# Patient Record
Sex: FEMALE | Race: WHITE | NOT HISPANIC OR LATINO | Employment: FULL TIME | URBAN - METROPOLITAN AREA
[De-identification: names, ages, dates, MRNs, and addresses within clinical notes are randomized per-mention and may not be internally consistent; named-entity substitution may affect disease eponyms.]

---

## 2017-12-18 ENCOUNTER — TRANSFERRED RECORDS (OUTPATIENT)
Dept: HEALTH INFORMATION MANAGEMENT | Facility: CLINIC | Age: 52
End: 2017-12-18

## 2018-08-28 ENCOUNTER — TRANSFERRED RECORDS (OUTPATIENT)
Dept: HEALTH INFORMATION MANAGEMENT | Facility: CLINIC | Age: 53
End: 2018-08-28

## 2018-09-27 ENCOUNTER — TRANSFERRED RECORDS (OUTPATIENT)
Dept: HEALTH INFORMATION MANAGEMENT | Facility: CLINIC | Age: 53
End: 2018-09-27

## 2018-11-08 ENCOUNTER — OFFICE VISIT (OUTPATIENT)
Dept: SURGERY | Facility: CLINIC | Age: 53
End: 2018-11-08
Payer: COMMERCIAL

## 2018-11-08 ENCOUNTER — TELEPHONE (OUTPATIENT)
Dept: GASTROENTEROLOGY | Facility: CLINIC | Age: 53
End: 2018-11-08

## 2018-11-08 VITALS
BODY MASS INDEX: 50.21 KG/M2 | WEIGHT: 283.4 LBS | TEMPERATURE: 98 F | SYSTOLIC BLOOD PRESSURE: 132 MMHG | HEART RATE: 66 BPM | HEIGHT: 63 IN | DIASTOLIC BLOOD PRESSURE: 77 MMHG | OXYGEN SATURATION: 99 %

## 2018-11-08 VITALS
OXYGEN SATURATION: 99 % | SYSTOLIC BLOOD PRESSURE: 132 MMHG | HEART RATE: 66 BPM | HEIGHT: 63 IN | TEMPERATURE: 98 F | DIASTOLIC BLOOD PRESSURE: 77 MMHG | WEIGHT: 283.4 LBS | BODY MASS INDEX: 50.21 KG/M2

## 2018-11-08 DIAGNOSIS — E66.01 MORBID OBESITY (H): ICD-10-CM

## 2018-11-08 DIAGNOSIS — E66.01 MORBID OBESITY (H): Primary | ICD-10-CM

## 2018-11-08 DIAGNOSIS — Z98.84 S/P GASTRIC BYPASS: ICD-10-CM

## 2018-11-08 DIAGNOSIS — K63.89 BACTERIAL OVERGROWTH SYNDROME: ICD-10-CM

## 2018-11-08 DIAGNOSIS — Z98.84 S/P GASTRIC BYPASS: Primary | ICD-10-CM

## 2018-11-08 LAB
ALBUMIN SERPL-MCNC: 3.5 G/DL (ref 3.4–5)
ALP SERPL-CCNC: 79 U/L (ref 40–150)
ALT SERPL W P-5'-P-CCNC: 28 U/L (ref 0–50)
ANION GAP SERPL CALCULATED.3IONS-SCNC: 8 MMOL/L (ref 3–14)
AST SERPL W P-5'-P-CCNC: 29 U/L (ref 0–45)
BILIRUB SERPL-MCNC: 0.5 MG/DL (ref 0.2–1.3)
BUN SERPL-MCNC: 11 MG/DL (ref 7–30)
CALCIUM SERPL-MCNC: 8.9 MG/DL (ref 8.5–10.1)
CHLORIDE SERPL-SCNC: 104 MMOL/L (ref 94–109)
CO2 SERPL-SCNC: 27 MMOL/L (ref 20–32)
CREAT SERPL-MCNC: 0.86 MG/DL (ref 0.52–1.04)
DEPRECATED CALCIDIOL+CALCIFEROL SERPL-MC: 34 UG/L (ref 20–75)
ERYTHROCYTE [DISTWIDTH] IN BLOOD BY AUTOMATED COUNT: 14.1 % (ref 10–15)
FERRITIN SERPL-MCNC: 25 NG/ML (ref 8–252)
GFR SERPL CREATININE-BSD FRML MDRD: 69 ML/MIN/1.7M2
GLUCOSE SERPL-MCNC: 89 MG/DL (ref 70–99)
HBA1C MFR BLD: 6 % (ref 0–5.6)
HCT VFR BLD AUTO: 45.4 % (ref 35–47)
HGB BLD-MCNC: 14.2 G/DL (ref 11.7–15.7)
MCH RBC QN AUTO: 27.7 PG (ref 26.5–33)
MCHC RBC AUTO-ENTMCNC: 31.3 G/DL (ref 31.5–36.5)
MCV RBC AUTO: 89 FL (ref 78–100)
PLATELET # BLD AUTO: 294 10E9/L (ref 150–450)
POTASSIUM SERPL-SCNC: 4.4 MMOL/L (ref 3.4–5.3)
PROT SERPL-MCNC: 7.5 G/DL (ref 6.8–8.8)
PTH-INTACT SERPL-MCNC: 80 PG/ML (ref 18–80)
RBC # BLD AUTO: 5.13 10E12/L (ref 3.8–5.2)
SODIUM SERPL-SCNC: 139 MMOL/L (ref 133–144)
VIT B12 SERPL-MCNC: 635 PG/ML (ref 193–986)
WBC # BLD AUTO: 8.6 10E9/L (ref 4–11)

## 2018-11-08 RX ORDER — ASCORBIC ACID 500 MG
TABLET ORAL
COMMUNITY
Start: 2005-04-25

## 2018-11-08 RX ORDER — FLAXSEED OIL
OIL (ML) MISCELLANEOUS
COMMUNITY
Start: 2013-11-22 | End: 2020-06-08

## 2018-11-08 RX ORDER — OMEPRAZOLE 40 MG/1
40 CAPSULE, DELAYED RELEASE ORAL
COMMUNITY
Start: 2013-11-22

## 2018-11-08 RX ORDER — FLUOCINONIDE 0.5 MG/G
OINTMENT TOPICAL
COMMUNITY
Start: 2018-10-17 | End: 2020-06-08

## 2018-11-08 RX ORDER — ACETAMINOPHEN 160 MG
TABLET,DISINTEGRATING ORAL
COMMUNITY
Start: 2013-11-21

## 2018-11-08 RX ORDER — METRONIDAZOLE 500 MG/1
500 TABLET ORAL 2 TIMES DAILY
Qty: 20 TABLET | Refills: 0 | Status: SHIPPED | OUTPATIENT
Start: 2018-11-08 | End: 2020-06-08

## 2018-11-08 RX ORDER — CETIRIZINE HYDROCHLORIDE 10 MG/1
10 TABLET ORAL
COMMUNITY
Start: 2018-01-08 | End: 2021-07-27

## 2018-11-08 RX ORDER — CIPROFLOXACIN 500 MG/1
500 TABLET, FILM COATED ORAL 2 TIMES DAILY
Qty: 20 TABLET | Refills: 0 | Status: SHIPPED | OUTPATIENT
Start: 2018-11-08 | End: 2020-06-08

## 2018-11-08 RX ORDER — CHLORAL HYDRATE 500 MG
CAPSULE ORAL
COMMUNITY
Start: 2013-11-22 | End: 2020-06-08

## 2018-11-08 RX ORDER — LANOLIN ALCOHOL/MO/W.PET/CERES
1000 CREAM (GRAM) TOPICAL
COMMUNITY
Start: 2013-11-22

## 2018-11-08 RX ORDER — M-VIT,TX,IRON,MINS/CALC/FOLIC 27MG-0.4MG
TABLET ORAL
COMMUNITY
Start: 2004-10-07

## 2018-11-08 ASSESSMENT — PATIENT HEALTH QUESTIONNAIRE - PHQ9
SUM OF ALL RESPONSES TO PHQ QUESTIONS 1-9: 9
SUM OF ALL RESPONSES TO PHQ QUESTIONS 1-9: 9
10. IF YOU CHECKED OFF ANY PROBLEMS, HOW DIFFICULT HAVE THESE PROBLEMS MADE IT FOR YOU TO DO YOUR WORK, TAKE CARE OF THINGS AT HOME, OR GET ALONG WITH OTHER PEOPLE: NOT DIFFICULT AT ALL

## 2018-11-08 NOTE — LETTER
Date:November 9, 2018      Patient was self referred, no letter generated. Do not send.        [facility] Health Information

## 2018-11-08 NOTE — PATIENT INSTRUCTIONS
Goal:   1.3 meals daily, use protein shake or bar if busy  2. 48-64 of water/fluids daily   3. If snack need: veggies or fruit   4. Try crock-pot meal with soft tender meat     *Protein Shake Criteria: no more than 210 Calories, at least 20 grams of protein, and less than 10 grams of sugar     Meal Replacement Shake Options:   M Health VLC smoothie (160 Calories, 20 g protein)   Premier Protein (160 Calories, 30 g protein)  Slim Fast Advanced Nutrition (180 Calories, 20 g protein)  Muscle Milk, lactose-free, 17 oz bottle (210 Calories, 30 g protein)  Integrated Supplements, no artificial sugars (110 Calories, 20 g protein)  Quest Protein Bars (190 Calories, 20 g protein)  No Cow Protein Bar, gluten, dairy, and soy free (200 Calories, 20 g protein)    Clear Liquid options:  BiPro  Premier Protein clear  [id number]  M Health Protein 15 Concentrate    Frozen Meal Replacements  Healthy Choice  Lean Cuisine  Atkins Meals  Smart Ones     Follow up with RD in 2-3 months    Alicia Artis, MS, RD, LD  If you need to schedule or reschedule with a dietitian please call [phone].

## 2018-11-08 NOTE — PROGRESS NOTES
"New Bariatric Nutrition Consultation Note    Reason For Visit: Nutrition Assessment    Marcy Mitchell is a 53 year old presenting today for new bariatric nutrition consult. Pt had Karen-Y-En in 2004 with Dr. Canseco at [facility]. Pt is accompanied by her . She is referred by Gina Carlson.      Support System Reviewed With Patient 11/8/2018   Who do you have in your support network that can be available to help you for the first 2 weeks after surgery? Yes   Who can you count on for support throughout your weight loss surgery journey? Spouse, family       ANTHROPOMETRICS:  Estimated body mass index is 51.01 kg/(m^2) as calculated from the following:    Height as of an earlier encounter on 11/8/18: 1.588 m (5' 2.5\").    Weight as of an earlier encounter on 11/8/18: 128.5 kg (283 lb 6.4 oz).       11/8/2018   I have tried the following methods to lose weight Watching portions or calories, Exercise, Weight Watchers, Atkins type diet (low carb/high protein), Pre packaged meals ex: Nutrisystem, Slimfast, Weight Loss Surgery, Physician directed program       Weight Loss Questions Reviewed With Patient 11/8/2018   How long have you been overweight? Since early childhood     SUPPLEMENT INFORMATION:  viT C, Vit D, [place] multivitamin 2x daily,  Vit B12, fish oil, flax seed oil, calcium (unsure of dosage)     NUTRITION HISTORY:  PT s/p Karen-en-Y in 2004 and was able to keep the weight off for 8 years when she started to have episodes of emesis. Pt had an EGD at 2013 that showed erosion/ulceration. During this, the pt put herself on a soft foods and liquid diet. She did gain weight despite the frequent episodes of vomiting. 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 At this time she is not having difficulty eating or with episodes of emesis. She does report difficulties with eating certain foods such as meat.     Recall Diet Questions Reviewed With Patient 11/8/2018   Describe what " you typically consume for breakfast (typical or most recent): Oatmeal, eggs   Describe what you typically consume for lunch (typical or most recent): Salad (spinach) , soup (chicken sausage and spinach)    Describe what you typically consume for supper (typical or most recent): Fish, soup, chicken   Describe what you typically consume as snacks (typical or most recent): Chips, popcorn, fruit, Cheetos, Halloween candy   How many ounces of water, or other low calorie drinks, do you drink daily (8 oz=1 glass)? 24 oz   How many ounces of caffeine (coffee, tea, pop) do you drink daily (8 oz=1 glass)? 16 oz   How often do you drink alcohol? Never   Beverages:  coffee (2 cups) - creamer (international delight vanilla) , little water 8-16 oz , OJ ~3-4 oz, 8oz skim milk     Eating Habits 11/8/2018   Do you have any dietary restrictions? No   Do you currently binge eat (eat a large amount of food in a short time)? No   Are you an emotional eater? Yes   Do you get up to eat after falling asleep? No   What foods do you crave? Carba       ADDITIONAL INFORMATION:    Dining Out History Reviewed With Patient 11/8/2018   How often do you dine out? Rarely.   Where do you dine out? (select all that apply) sit-down restaurants   What types of food do you order when you dine out? Variety       Physical Activity Reviewed With Patient 11/8/2018   How often do you exercise? Never   What keeps you from being more active?  Pain, Shortness of breath, Lack of Time       NUTRITION DIAGNOSIS:  Obesity r/t long history of self-monitoring deficit and excessive energy intake aeb BMI >30 kg/m2.    INTERVENTION:  Intervention Provided/Education Provided on  - Vitamin and mineral supplements: taking calcium one in the morning and one at night. Gave and reviewed supplementation handout.   - Review diet guidelines for post-op/maintenance diet. Dicussed not skipping meals as the pt stated she may skip breakfast and lunch. Working on eating slower and  decreases/choosing better snack options. Handout given and reviewed.   - Discussed her intolerance of eating meat, recommended trying crock pot meals and eating slowly (20-30 minutes) and chewing well.   -Pt and  had question. All questions were answered at this time.     Questions Reviewed With Patient 11/8/2018   How ready are you to make changes regarding your weight? Number 1 = Not ready at all to make changes up to 10 = very ready. 10   How confident are you that you can change? 1 = Not confident that you will be successful making changes up to 10 = very confident. 10       Patient Understanding: good  Expected Compliance: good    GOALS:  1.3 meals daily, use protein shake or bar if busy  2. 48-64 of water/fluids daily   3. If snack need: veggies or fruit   4. Try crock-pot meal with soft tender meat     *Protein Shake Criteria: no more than 210 Calories, at least 20 grams of protein, and less than 10 grams of sugar     Meal Replacement Shake Options:   M Health VLC smoothie (160 Calories, 20 g protein)   Premier Protein (160 Calories, 30 g protein)  Slim Fast Advanced Nutrition (180 Calories, 20 g protein)  Muscle Milk, lactose-free, 17 oz bottle (210 Calories, 30 g protein)  Integrated Supplements, no artificial sugars (110 Calories, 20 g protein)  Quest Protein Bars (190 Calories, 20 g protein)  No Cow Protein Bar, gluten, dairy, and soy free (200 Calories, 20 g protein)    Clear Liquid options:  BiPro  Premier Protein clear  [id number]  M Health Protein 15 Concentrate    Frozen Meal Replacements  Healthy Choice  Lean Cuisine  Atkins Meals  Smart Ones     Follow-Up:   Recommend 2-3 follow up visits to assist with lifestyle changes or per insurance.      Time spent with patient: 30 minutes.  Alicia Artis, MS, RD, LD

## 2018-11-08 NOTE — PATIENT INSTRUCTIONS
See Alicia wolf in 1 month  See Gina VELASCO in 2-3 months for return medical weight management  Start Qsymia    To schedule your endoscopy with Dr Nix, call the Endoscopy department at [phone].  Date:__________  Time:__________    Endoscopy Instructions:  -Have nothing to eat or drink for 6 hours prior to the check-in time.   -Check-in 30 minutes before the procedure time.  -Please bring a  to escort you home after the procedure.  -You cannot drive for 24 hours after the procedure due to the sedation.  -The Endoscopy Department is located on the 1st floor of the [facility].  -The [facility] [place] is at [address] [id number].  Ph: [phone] for directions or nursing questions.  -[employer] parking is available for the same price as parking in the [employer] ramp.      MEDICATION STARTED AT THIS APPOINTMENT    We are starting Qsymia. This is a specific obesity medication and is a combination of Phentermine and Topiramate, formulated as a sustained release, taken one time a day. There are a few different doses of the medication. Doses come in 3.75/23 mg (usually skipped), 7.5/46 mg, 11.25/69 mg, and 15/92 mg. Call the nurse at [phone] if you have any questions or concerns. (Do not stop taking it if you don't think it's working. For some people it works even though they do not feel much different.)    For some of our patients, the pills work right away. They feel and think quite differently about food. Other patients don't feel much of a change but find in fact they have lost weight! Like all weight loss medications, Qsymia works best when you help it work.  This means:    1) Have less tempting high calorie (fattening) food around the house or office    2) Have lower calorie food (fruits, vegetables,low fat meats and dairy) for snacks    3) Eat out only one time or less each week.   4) Eat your meals at a table with the TV or  computer off.    Side-effects (generally well tolerated because it is a sustained release medication)    Topiramate:   -Tingling in hands,feet, or face (usually not very troublesome)   -Mental confusion and word finding trouble (about 10% of patients have this.)     -Feeling sleepy or a bit dopey- this goes away very soon after starting.      Phentermine:    -Feelings of racing pulse or rapid heart beat.    -Increased anxiety.   -Some people can get an elevated blood pressure. Because of this we may have  you  come back within a week or so of starting the medication for a blood pressure check.    One of the dangers of topiramate is the possibility of birth defects--if you get pregnant when you are on it, there is the risk that your baby will be born with a cleft lip or palate.  If you are on topiramate and of child bearing age, you need to be on a reliable form of birth control or refrain from sexual intercourse.     It is very rare for insurance to pay for this and it typically costs around $200 per month. Please check out the website www.qsymia.com and sign up for the Pharmacy savings program to save $75 a month for 12 months if eligible. The medication can also be paid for out of pocket. It may require a prior authorization which could take up to 1-2 weeks.      In order to get refills of this or any medication we prescribe you must be seen in the medical weight mgmt clinic every 2-4 months. Please have your pharmacy fax a refill request to [phone].

## 2018-11-08 NOTE — MR AVS SNAPSHOT
After Visit Summary   11/8/2018    Marcy Mitchell    MRN: [id number]           Patient Information     Date Of Birth          1965        Visit Information        Provider Department      11/8/2018 8:00 AM Gina Carlson PA-C [facility]        Today's Diagnoses     S/P gastric bypass    -  1    Bacterial overgrowth syndrome        Morbid obesity (H)          Care Instructions    See Alicia wolf in 1 month  See Gina VELASCO in 2-3 months for return medical weight management  Start Qsymia    To schedule your endoscopy with Dr Nix, call the Endoscopy department at [phone].  Date:__________  Time:__________    Endoscopy Instructions:  -Have nothing to eat or drink for 6 hours prior to the check-in time.   -Check-in 30 minutes before the procedure time.  -Please bring a  to escort you home after the procedure.  -You cannot drive for 24 hours after the procedure due to the sedation.  -The Endoscopy Department is located on the 1st floor of the [facility].  -The [facility] is at [address] [place] [address].  Ph: [phone] for directions or nursing questions.  -[email or web address] parking is available for the same price as parking in the [employer] ramp.      MEDICATION STARTED AT THIS APPOINTMENT    We are starting Qsymia. This is a specific obesity medication and is a combination of Phentermine and Topiramate, formulated as a sustained release, taken one time a day. There are a few different doses of the medication. Doses come in 3.75/23 mg (usually skipped), 7.5/46 mg, 11.25/69 mg, and 15/92 mg. Call the nurse at [phone] if you have any questions or concerns. (Do not stop taking it if you don't think it's working. For some people it works even though they do not feel much different.)    For some of our patients, the pills work right away. They feel and think quite differently about food.  Other patients don't feel much of a change but find in fact they have lost weight! Like all weight loss medications, Qsymia works best when you help it work.  This means:    1) Have less tempting high calorie (fattening) food around the house or office    2) Have lower calorie food (fruits, vegetables,low fat meats and dairy) for snacks    3) Eat out only one time or less each week.   4) Eat your meals at a table with the TV or computer off.    Side-effects (generally well tolerated because it is a sustained release medication)    Topiramate:   -Tingling in hands,feet, or face (usually not very troublesome)   -Mental confusion and word finding trouble (about 10% of patients have this.)     -Feeling sleepy or a bit dopey- this goes away very soon after starting.      Phentermine:    -Feelings of racing pulse or rapid heart beat.    -Increased anxiety.   -Some people can get an elevated blood pressure. Because of this we may have  you  come back within a week or so of starting the medication for a blood pressure check.    One of the dangers of topiramate is the possibility of birth defects--if you get pregnant when you are on it, there is the risk that your baby will be born with a cleft lip or palate.  If you are on topiramate and of child bearing age, you need to be on a reliable form of birth control or refrain from sexual intercourse.     It is very rare for insurance to pay for this and it typically costs around $200 per month. Please check out the website www.qsymia.com and sign up for the Pharmacy savings program to save $75 a month for 12 months if eligible. The medication can also be paid for out of pocket. It may require a prior authorization which could take up to 1-2 weeks.      In order to get refills of this or any medication we prescribe you must be seen in the medical weight mgmt clinic every 2-4 months. Please have your pharmacy fax a refill request to [phone].                    Follow-ups after  your visit        Additional Services     GASTROENTEROLOGY ADULT REF PROCEDURE ONLY                 Future tests that were ordered for you today     Open Future Orders        Priority Expected Expires Ordered    CBC with platelets Routine 11/8/2018 12/8/2018 11/8/2018    Comprehensive metabolic panel Routine 11/8/2018 12/8/2018 11/8/2018    Hemoglobin A1c Routine 11/8/2018 12/8/2018 11/8/2018    Parathyroid Hormone Intact Routine 11/8/2018 12/8/2018 11/8/2018    Vitamin D Deficiency Routine 11/8/2018 12/8/2018 11/8/2018    Vitamin B12 Routine 11/8/2018 12/8/2018 11/8/2018    Vitamin A Routine 11/8/2018 12/8/2018 11/8/2018    Ferritin Routine 11/8/2018 12/8/2018 11/8/2018            Who to contact     Please call your clinic at [phone] to:    Ask questions about your health    Make or cancel appointments    Discuss your medicines    Learn about your test results    Speak to your doctor            Additional Information About Your Visit        [employer] Information     [employer] gives you secure access to your electronic health record. If you see a primary care provider, you can also send messages to your care team and make appointments. If you have questions, please call your primary care clinic.  If you do not have a primary care provider, please call [phone] and they will assist you.      [employer] is an electronic gateway that provides easy, online access to your medical records. With [employer], you can request a clinic appointment, read your test results, renew a prescription or communicate with your care team.     To access your existing account, please contact your [facility] or call [phone] for assistance.        Care EveryWhere ID     This is your Care EveryWhere ID. This could be used by other organizations to access your [facility] medical records  [id number]        Your Vitals Were     Pulse Temperature Height Pulse Oximetry BMI (Body Mass Index)       66 98  F (36.7  " C) (Oral) 5' 2.5\" 99% 51.01 kg/m2        Blood Pressure from Last 3 Encounters:   11/08/18 132/77   11/08/18 132/77    Weight from Last 3 Encounters:   11/08/18 283 lb 6.4 oz   11/08/18 283 lb 6.4 oz              We Performed the Following     GASTROENTEROLOGY ADULT REF PROCEDURE ONLY          Today's Medication Changes          These changes are accurate as of 11/8/18 10:07 AM.  If you have any questions, ask your nurse or doctor.               Start taking these medicines.        Dose/Directions    ciprofloxacin 500 MG tablet   Commonly known as:  CIPRO   Used for:  S/P gastric bypass, Bacterial overgrowth syndrome   Started by:  Gina Carlson PA-C        Dose:  500 mg   Take 1 tablet (500 mg) by mouth 2 times daily   Quantity:  20 tablet   Refills:  0       metroNIDAZOLE 500 MG tablet   Commonly known as:  FLAGYL   Used for:  Bacterial overgrowth syndrome, S/P gastric bypass   Started by:  Gina Carlson PA-C        Dose:  500 mg   Take 1 tablet (500 mg) by mouth 2 times daily   Quantity:  20 tablet   Refills:  0       Phentermine-Topiramate 7.5-46 MG Cp24   Used for:  Morbid obesity (H)   Started by:  Gina Carlson PA-C        Dose:  1 capsule   Take 1 capsule by mouth daily   Quantity:  30 capsule   Refills:  3            Where to get your medicines      Some of these will need a paper prescription and others can be bought over the counter.  Ask your nurse if you have questions.     Bring a paper prescription for each of these medications     ciprofloxacin 500 MG tablet    metroNIDAZOLE 500 MG tablet    Phentermine-Topiramate 7.5-46 MG Cp24                Primary Care Provider Fax #    Physician No Ref-Primary [phone]       No address on file        Equal Access to Services     [facility] : [place], darek albarran, chiqui rodriguez. [facility] [phone].    ATENCIÓN: Si louisa guzmán a xiao " disposición servicios gratuitos de asistencia lingüística. Kenia ball [phone].    We comply with applicable federal civil rights laws and [place] laws. We do not discriminate on the basis of race, color, national origin, age, disability, sex, sexual orientation, or gender identity.            Thank you!     Thank you for choosing [facility]NAG[facility]  for your care. Our goal is always to provide you with excellent care. Hearing back from our patients is one way we can continue to improve our services. Please take a few minutes to complete the written survey that you may receive in the mail after your visit with us. Thank you!             Your Updated Medication List - Protect others around you: Learn how to safely use, store and throw away your medicines at www.disposemymeds.org.          This list is accurate as of 11/8/18 10:07 AM.  Always use your most recent med list.                   Brand Name Dispense Instructions for use Diagnosis    ascorbic acid 500 MG Tabs      Indications: PN:        cetirizine 10 MG tablet    zyrTEC     Take 10 mg by mouth        ciprofloxacin 500 MG tablet    CIPRO    20 tablet    Take 1 tablet (500 mg) by mouth 2 times daily    S/P gastric bypass, Bacterial overgrowth syndrome       cyanocobalamin 1000 MCG tablet    vitamin  B-12     Take 1,000 mcg by mouth        fish oil-omega-3 fatty acids 1000 MG capsule           fluocinonide 0.05 % ointment    LIDEX     Apply twice daily until clear, then once daily x 2 wks, then qod x 2 wks        Linseed Oil Oil      by Misc.(Non-Drug; Combo Route) route.        metroNIDAZOLE 500 MG tablet    FLAGYL    20 tablet    Take 1 tablet (500 mg) by mouth 2 times daily    Bacterial overgrowth syndrome, S/P gastric bypass       omeprazole 40 MG capsule    priLOSEC     Take 40 mg by mouth        Phentermine-Topiramate 7.5-46 MG Cp24     30 capsule    Take 1 capsule by mouth daily    Morbid obesity (H)       THERAPEUTIC  MULTIVIT/MINERAL Tabs           vitamin D3 2000 units Caps

## 2018-11-08 NOTE — LETTER
Date:November 9, 2018      Patient was self referred, no letter generated. Do not send.        [facility] Health Information

## 2018-11-08 NOTE — NURSING NOTE
"(   Chief Complaint   Patient presents with     Consult     Second opinion on \"Candy cane\" deformity on endoscopy.    )    ( Weight: 283 lb 6.4 oz )  ( Height: 5' 2.5\" )  ( BMI (Calculated): 51.12 )  ( Initial Weight: 283 lb 6.4 oz )  ( Cumulative weight loss (lbs): 0 )  (   )  (   )  (   )  (   )    ( BP: 132/77 )  (   )  ( Temp: 98  F (36.7  C) )  ( Temp src: Oral )  ( Pulse: 66 )  (   )  ( SpO2: 99 % )    ( There is no problem list on file for this patient.   )  (   Current Outpatient Prescriptions   Medication Sig Dispense Refill     ascorbic acid 500 MG TABS Indications: PN:         cetirizine (ZYRTEC) 10 MG tablet Take 10 mg by mouth       Cholecalciferol (VITAMIN D3) 2000 units CAPS        cyanocobalamin (VITAMIN  B-12) 1000 MCG tablet Take 1,000 mcg by mouth       fish oil-omega-3 fatty acids 1000 MG capsule        Flaxseed Oil (LINSEED OIL) OIL by Misc.(Non-Drug; Combo Route) route.       fluocinonide (LIDEX) 0.05 % ointment Apply twice daily until clear, then once daily x 2 wks, then qod x 2 wks       Multiple Vitamins-Minerals (THERAPEUTIC MULTIVIT/MINERAL) TABS        omeprazole (PRILOSEC) 40 MG capsule Take 40 mg by mouth      )  ( Diabetes Eval:    )    ( Pain Eval:  Data Unavailable )    ( Wound Eval:       )    (   History   Smoking Status     Former Smoker     Packs/day: 2.00     Years: 20.00     Types: Cigarettes     Quit date: 11/2/2004   Smokeless Tobacco     Never Used    )    ( Signed By:  Araceli La; November 8, 2018; 8:31 AM )    "

## 2018-11-08 NOTE — LETTER
"2018       RE: Marcy Mitchell  [address]  [place] [address]     Dear Colleague,    Thank you for referring your patient, Marcy Mitchell, to the [facility] SURGICAL WEIGHT MANAGEMENT at [facility]. Please see a copy of my visit note below.    New Re-establish Bariatric Surgery Consultation Note    RE: Marcy Mitchell  MR#: [id number]  : 1965      Referring provider: No flowsheet data found.    Chief Complaint/Reason for visit: evaluation for possible weight loss surgery    Dear No Ref-Primary, Physician (General),    I had the pleasure of seeing your patient, Marcy Mitchell, to evaluate her obesity and consider her for possible weight loss surgery. As you know, Marcy Mitchell is 53 year old.  She has a height of 5' 2.5\", a weight of 283 lbs 6.4 oz, and calculated Body mass index is 51.01 kg/(m^2).      Lap gastric bypass  Dr Canseco at [facility]. Did well after surgery losing >90 lbs. She kept her weight off until about 8 years ago when she started having vomiting. In  she had EGD at [facility] that showed erosion/ulcer.  She kept gaining weight and having vomiting and then she went to see Dr Bosch at [place]. She had another EGD in 2018 with stricture dilation and candy cane blind limb. Dr Bosch did not recommend revision at this time.  She has been doing better with eating and is no longer vomiting everyday.  She no longer has pain when eating.  She is still struggling with weight regain. She has diarrhea twice daily and bloating.  Hx of h pylori. She still is having difficulty with eating some solid foods such as meat.    She is here for a second opinion from Dr Nix.    No smoking since 2004  No NSAID's.    Highest weight 289 lbs  Lowest 198 lbs  Weight today 283 lbs    She is taking omeprazole  40 mg twice daily    Hx of iron infusions the last one was 2018.     HISTORY OF PRESENT ILLNESS:  Weight Loss History Reviewed with Patient " 11/8/2018   How long have you been overweight? Since early childhood   What is the most that you have ever weighed? 293   What is the most weight you have lost? 90   I have tried the following methods to lose weight Watching portions or calories, Exercise, Weight Watchers, Atkins type diet (low carb/high protein), Pre packaged meals ex: Nutrisystem, Slimfast, Weight Loss Surgery, Physician directed program   I have tried the following weight loss medications? (Check all that apply) None   Have you ever had weight loss surgery? Yes   Please select the type of weight loss surgery you had (select all that apply): gastric bypass / Karen-en-Y       CO-MORBIDITIES OF OBESITY INCLUDE:     11/8/2018   I have the following co-morbidities associated with obesity: GERD (Reflux), Weight Bearing Joint Pain, Stress Incontinence   Are you taking daily medication for heartburn, acid reflux, or GERD (acid reflux disease)? Yes       PAST MEDICAL HISTORY:  Past Medical History:   Diagnosis Date     Anemia 2008     Esophageal reflux 2008     Hearing problem 8/2018    Tinitus     History of steroid therapy     Ointment for eczema     Skin disease 2008    Eczema       PAST SURGICAL HISTORY:  Past Surgical History:   Procedure Laterality Date     ABDOMEN SURGERY      Rny and cholecystectomy     CHOLECYSTECTOMY       COLONOSCOPY         FAMILY HISTORY:   Family History   Problem Relation Age of Onset     Diabetes Sister      Hypertension Sister      Hyperlipidemia Sister      Obesity Sister      Hyperlipidemia Sister      Depression Sister      Anxiety Disorder Sister      Mental Illness Sister      Asthma Sister      Obesity Sister      Hyperlipidemia Father      Prostate Cancer Father      Cerebrovascular Disease Maternal Grandmother      Other Cancer Mother      Adrenal     Crohn Disease Nephew        SOCIAL HISTORY:   Social History Questions Reviewed With Patient 11/8/2018   Which best describes your employment status (select all that  apply) I work full-time   If you work, what is your occupation?    Which best describes your marital status:    Do you have children? No   Who do you have in your support network that can be available to help you for the first 2 weeks after surgery? Yes   Who can you count on for support throughout your weight loss surgery journey? Spouse, family   Can you afford 3 meals a day?  Yes   Can you afford 50-60 dollars a month for vitamins? Yes       HABITS:     11/8/2018   How often do you drink alcohol? Never   Do you currently use any of the following Nicotine products? No   Have you ever used any of the following nicotine products? Cigarettes   If you previously used any of these products, what year did you quit? 2004   Have you or are you currently using street drugs or prescription strength medication for which you do not have a prescription for? No   Do you have a history of chemical dependency (alcohol or drug abuse)? No       PSYCHOLOGICAL HISTORY:   Psychological History Reviewed With Patient 11/8/2018   Have you ever attempted suicide? Never.   Have you had thoughts of suicide in the past year? No   Have you ever been hospitalized for mental illness or a suicide attempt? Never.   Do you have a history of chronic pain? No   Have you ever been diagnosed with fibromyalgia? No   Are you currently being treated for any of the following? (select all that apply) I do not have a mental illness       ROS:     11/8/2018   Skin:  None of the above   HEENT: Dizziness/lightheadedness   Musculoskeletal: Joint Pain, Back pain, Limited mobility   Cardiovascular: Chest pain, Shortness of breath with activity   Pulmonary: Shortness of breath with activity, Snoring   Gastrointestinal: Heartburn, Reflux, Diarrhea, Constipation   Genitourinary: Stress incontinence (losing urine when coughing, sneezing, etc.)   Hematological: None of the above   Neurological: None of the above   Female only: Post-menopausal  "      EATING BEHAVIORS:     11/8/2018   Have you or anyone else thought that you had an eating disorder? No   Do you currently binge eat (eat a large amount of food in a short time)? No   Are you an emotional eater? Yes   Do you get up to eat after falling asleep? No       EXERCISE:     11/8/2018   How often do you exercise? Never   What keeps you from being more active?  Pain, Shortness of breath, Lack of Time       MEDICATIONS:  Current Outpatient Prescriptions   Medication     ascorbic acid 500 MG TABS     cetirizine (ZYRTEC) 10 MG tablet     Cholecalciferol (VITAMIN D3) 2000 units CAPS     ciprofloxacin (CIPRO) 500 MG tablet     cyanocobalamin (VITAMIN  B-12) 1000 MCG tablet     fish oil-omega-3 fatty acids 1000 MG capsule     Flaxseed Oil (LINSEED OIL) OIL     fluocinonide (LIDEX) 0.05 % ointment     metroNIDAZOLE (FLAGYL) 500 MG tablet     Multiple Vitamins-Minerals (THERAPEUTIC MULTIVIT/MINERAL) TABS     omeprazole (PRILOSEC) 40 MG capsule     No current facility-administered medications for this visit.        ALLERGIES:  Allergies   Allergen Reactions     Codeine      Other reaction(s): Gastrointestinal  Vomiting, tylenol #3 after wisdom teeth extraction     Nsaids Other (See Comments)     Unable to take         LABS/IMAGING/MEDICAL RECORDS REVIEW:     PHYSICAL EXAM:  /77  Pulse 66  Temp 98  F (36.7  C) (Oral)  Ht 5' 2.5\"  Wt 283 lb 6.4 oz  SpO2 99%  BMI 51.01 kg/m2  General: NAD  Neurologic: A & O x 3, gait normal  Head: normocephalic, atraumatic  HEENT: PERRL, EOMI.   Respiratory: respirations unlabored  Abdomen: Obese, Soft NT ND   Extremities: No LE swelling   Skin: warm and dry.  No rashes on exposed skin  Psychiatric: Mentation and Affect appear normal      In summary, Marcy Mitchell has Class III obesity with a body mass index of Body mass index is 51.01 kg/(m^2). kg/m2 and the comorbidities stated above. Hx of gastric bypass with weight regain in the last 8 years since struggling with " ulcer/stricture and vomiting.  She has done better since stricture dilation 2 months ago and omeprazole 40 mg twice daily. She saw me and Dr Nix today for 2nd opinion regarding this.  Plan is for repeat EGD with Dr Nix and to work on relearning to eat solid foods since she has improved since stricture. She has had forced maladaptive eating over the years due to ulcer/stricture.       See Alicia dietitian in 1 month  See Gina VELASCO in 2-3 months for return medical weight management  Start Qsymia 7.5/46  EGD with Dr Nix.  Schedule with endoscopy dept.  Bariatric labs today    MEDICATION STARTED AT THIS APPOINTMENT    We are starting Qsymia. This is a specific obesity medication and is a combination of Phentermine and Topiramate, formulated as a sustained release, taken one time a day. There are a few different doses of the medication. Doses come in 3.75/23 mg (usually skipped), 7.5/46 mg, 11.25/69 mg, and 15/92 mg. Call the nurse at [phone] if you have any questions or concerns. (Do not stop taking it if you don't think it's working. For some people it works even though they do not feel much different.)    For some of our patients, the pills work right away. They feel and think quite differently about food. Other patients don't feel much of a change but find in fact they have lost weight! Like all weight loss medications, Qsymia works best when you help it work.  This means:    1) Have less tempting high calorie (fattening) food around the house or office    2) Have lower calorie food (fruits, vegetables,low fat meats and dairy) for snacks    3) Eat out only one time or less each week.   4) Eat your meals at a table with the TV or computer off.    Side-effects (generally well tolerated because it is a sustained release medication)    Topiramate:   -Tingling in hands,feet, or face (usually not very troublesome)   -Mental confusion and word finding trouble (about 10% of patients have this.)     -Feeling  sleepy or a bit dopey- this goes away very soon after starting.      Phentermine:    -Feelings of racing pulse or rapid heart beat.    -Increased anxiety.   -Some people can get an elevated blood pressure. Because of this we may have  you  come back within a week or so of starting the medication for a blood pressure check.    One of the dangers of topiramate is the possibility of birth defects--if you get pregnant when you are on it, there is the risk that your baby will be born with a cleft lip or palate.  If you are on topiramate and of child bearing age, you need to be on a reliable form of birth control or refrain from sexual intercourse.     It is very rare for insurance to pay for this and it typically costs around $200 per month. Please check out the website www.qsymia.com and sign up for the Pharmacy savings program to save $75 a month for 12 months if eligible. The medication can also be paid for out of pocket. It may require a prior authorization which could take up to 1-2 weeks.      In order to get refills of this or any medication we prescribe you must be seen in the medical weight mgmt clinic every 2-4 months. Please have your pharmacy fax a refill request to [phone].      Sincerely,    Gina Carlson PA-C    I spent a total of 60 minutes face to face with Marcy during today's office visit. Over 50% of this time was spent counseling the patient and/or coordinating care.            Again, thank you for allowing me to participate in the care of your patient.      Sincerely,    Gina Carlson PA-C

## 2018-11-08 NOTE — LETTER
Date:November 27, 2018      Patient was self referred, no letter generated. Do not send.        [facility] Health Information

## 2018-11-08 NOTE — PROGRESS NOTES
"New Re-establish Bariatric Surgery Consultation Note    RE: Marcy Mitchell  MR#: [id number]  : 1965      Referring provider: No flowsheet data found.    Chief Complaint/Reason for visit: evaluation for possible weight loss surgery    Dear No Ref-Primary, Physician (General),    I had the pleasure of seeing your patient, Marcy Mitchell, to evaluate her obesity and consider her for possible weight loss surgery. As you know, Marcy Mitchell is 53 year old.  She has a height of 5' 2.5\", a weight of 283 lbs 6.4 oz, and calculated Body mass index is 51.01 kg/(m^2).      Lap gastric bypass  Dr Canseco at [facility]. Did well after surgery losing >90 lbs. She kept her weight off until about 8 years ago when she started having vomiting. In  she had EGD at [facility] that showed erosion/ulcer.  She kept gaining weight and having vomiting and then she went to see Dr Bosch at [place]. She had another EGD in 2018 with stricture dilation and candy cane blind limb. Dr Bosch did not recommend revision at this time.  She has been doing better with eating and is no longer vomiting everyday.  She no longer has pain when eating.  She is still struggling with weight regain. She has diarrhea twice daily and bloating.  Hx of h pylori. She still is having difficulty with eating some solid foods such as meat.    She is here for a second opinion from Dr Nix.    No smoking since   No NSAID's.    Highest weight 289 lbs  Lowest 198 lbs  Weight today 283 lbs    She is taking omeprazole  40 mg twice daily    Hx of iron infusions the last one was 2018.     HISTORY OF PRESENT ILLNESS:  Weight Loss History Reviewed with Patient 2018   How long have you been overweight? Since early childhood   What is the most that you have ever weighed? 293   What is the most weight you have lost? 90   I have tried the following methods to lose weight Watching portions or calories, Exercise, Weight Watchers, Atkins type diet " (low carb/high protein), Pre packaged meals ex: Nutrisystem, Slimfast, Weight Loss Surgery, Physician directed program   I have tried the following weight loss medications? (Check all that apply) None   Have you ever had weight loss surgery? Yes   Please select the type of weight loss surgery you had (select all that apply): gastric bypass / Karen-en-Y       CO-MORBIDITIES OF OBESITY INCLUDE:     11/8/2018   I have the following co-morbidities associated with obesity: GERD (Reflux), Weight Bearing Joint Pain, Stress Incontinence   Are you taking daily medication for heartburn, acid reflux, or GERD (acid reflux disease)? Yes       PAST MEDICAL HISTORY:  Past Medical History:   Diagnosis Date     Anemia 2008     Esophageal reflux 2008     Hearing problem 8/2018    Tinitus     History of steroid therapy     Ointment for eczema     Skin disease 2008    Eczema       PAST SURGICAL HISTORY:  Past Surgical History:   Procedure Laterality Date     ABDOMEN SURGERY      Rny and cholecystectomy     CHOLECYSTECTOMY       COLONOSCOPY         FAMILY HISTORY:   Family History   Problem Relation Age of Onset     Diabetes Sister      Hypertension Sister      Hyperlipidemia Sister      Obesity Sister      Hyperlipidemia Sister      Depression Sister      Anxiety Disorder Sister      Mental Illness Sister      Asthma Sister      Obesity Sister      Hyperlipidemia Father      Prostate Cancer Father      Cerebrovascular Disease Maternal Grandmother      Other Cancer Mother      Adrenal     Crohn Disease Nephew        SOCIAL HISTORY:   Social History Questions Reviewed With Patient 11/8/2018   Which best describes your employment status (select all that apply) I work full-time   If you work, what is your occupation?    Which best describes your marital status:    Do you have children? No   Who do you have in your support network that can be available to help you for the first 2 weeks after surgery? Yes   Who can you  count on for support throughout your weight loss surgery journey? Spouse, family   Can you afford 3 meals a day?  Yes   Can you afford 50-60 dollars a month for vitamins? Yes       HABITS:     11/8/2018   How often do you drink alcohol? Never   Do you currently use any of the following Nicotine products? No   Have you ever used any of the following nicotine products? Cigarettes   If you previously used any of these products, what year did you quit? 2004   Have you or are you currently using street drugs or prescription strength medication for which you do not have a prescription for? No   Do you have a history of chemical dependency (alcohol or drug abuse)? No       PSYCHOLOGICAL HISTORY:   Psychological History Reviewed With Patient 11/8/2018   Have you ever attempted suicide? Never.   Have you had thoughts of suicide in the past year? No   Have you ever been hospitalized for mental illness or a suicide attempt? Never.   Do you have a history of chronic pain? No   Have you ever been diagnosed with fibromyalgia? No   Are you currently being treated for any of the following? (select all that apply) I do not have a mental illness       ROS:     11/8/2018   Skin:  None of the above   HEENT: Dizziness/lightheadedness   Musculoskeletal: Joint Pain, Back pain, Limited mobility   Cardiovascular: Chest pain, Shortness of breath with activity   Pulmonary: Shortness of breath with activity, Snoring   Gastrointestinal: Heartburn, Reflux, Diarrhea, Constipation   Genitourinary: Stress incontinence (losing urine when coughing, sneezing, etc.)   Hematological: None of the above   Neurological: None of the above   Female only: Post-menopausal       EATING BEHAVIORS:     11/8/2018   Have you or anyone else thought that you had an eating disorder? No   Do you currently binge eat (eat a large amount of food in a short time)? No   Are you an emotional eater? Yes   Do you get up to eat after falling asleep? No       EXERCISE:      "11/8/2018   How often do you exercise? Never   What keeps you from being more active?  Pain, Shortness of breath, Lack of Time       MEDICATIONS:  Current Outpatient Prescriptions   Medication     ascorbic acid 500 MG TABS     cetirizine (ZYRTEC) 10 MG tablet     Cholecalciferol (VITAMIN D3) 2000 units CAPS     ciprofloxacin (CIPRO) 500 MG tablet     cyanocobalamin (VITAMIN  B-12) 1000 MCG tablet     fish oil-omega-3 fatty acids 1000 MG capsule     Flaxseed Oil (LINSEED OIL) OIL     fluocinonide (LIDEX) 0.05 % ointment     metroNIDAZOLE (FLAGYL) 500 MG tablet     Multiple Vitamins-Minerals (THERAPEUTIC MULTIVIT/MINERAL) TABS     omeprazole (PRILOSEC) 40 MG capsule     No current facility-administered medications for this visit.        ALLERGIES:  Allergies   Allergen Reactions     Codeine      Other reaction(s): Gastrointestinal  Vomiting, tylenol #3 after wisdom teeth extraction     Nsaids Other (See Comments)     Unable to take         LABS/IMAGING/MEDICAL RECORDS REVIEW:     PHYSICAL EXAM:  /77  Pulse 66  Temp 98  F (36.7  C) (Oral)  Ht 5' 2.5\"  Wt 283 lb 6.4 oz  SpO2 99%  BMI 51.01 kg/m2  General: NAD  Neurologic: A & O x 3, gait normal  Head: normocephalic, atraumatic  HEENT: PERRL, EOMI.   Respiratory: respirations unlabored  Abdomen: Obese, Soft NT ND   Extremities: No LE swelling   Skin: warm and dry.  No rashes on exposed skin  Psychiatric: Mentation and Affect appear normal      In summary, Marcy Mitchell has Class III obesity with a body mass index of Body mass index is 51.01 kg/(m^2). kg/m2 and the comorbidities stated above. Hx of gastric bypass with weight regain in the last 8 years since struggling with ulcer/stricture and vomiting.  She has done better since stricture dilation 2 months ago and omeprazole 40 mg twice daily. She saw me and Dr Nix today for 2nd opinion regarding this.  Plan is for repeat EGD with Dr Nix and to work on relearning to eat solid foods since she has " improved since stricture. She has had forced maladaptive eating over the years due to ulcer/stricture.       See Alicia dietitian in 1 month  See Gina VELASCO in 2-3 months for return medical weight management  Start Qsymia 7.5/46  EGD with Dr Nix.  Schedule with endoscopy dept.  Bariatric labs today    MEDICATION STARTED AT THIS APPOINTMENT    We are starting Qsymia. This is a specific obesity medication and is a combination of Phentermine and Topiramate, formulated as a sustained release, taken one time a day. There are a few different doses of the medication. Doses come in 3.75/23 mg (usually skipped), 7.5/46 mg, 11.25/69 mg, and 15/92 mg. Call the nurse at [phone] if you have any questions or concerns. (Do not stop taking it if you don't think it's working. For some people it works even though they do not feel much different.)    For some of our patients, the pills work right away. They feel and think quite differently about food. Other patients don't feel much of a change but find in fact they have lost weight! Like all weight loss medications, Qsymia works best when you help it work.  This means:    1) Have less tempting high calorie (fattening) food around the house or office    2) Have lower calorie food (fruits, vegetables,low fat meats and dairy) for snacks    3) Eat out only one time or less each week.   4) Eat your meals at a table with the TV or computer off.    Side-effects (generally well tolerated because it is a sustained release medication)    Topiramate:   -Tingling in hands,feet, or face (usually not very troublesome)   -Mental confusion and word finding trouble (about 10% of patients have this.)     -Feeling sleepy or a bit dopey- this goes away very soon after starting.      Phentermine:    -Feelings of racing pulse or rapid heart beat.    -Increased anxiety.   -Some people can get an elevated blood pressure. Because of this we may have  you  come back within a week or so of starting the  medication for a blood pressure check.    One of the dangers of topiramate is the possibility of birth defects--if you get pregnant when you are on it, there is the risk that your baby will be born with a cleft lip or palate.  If you are on topiramate and of child bearing age, you need to be on a reliable form of birth control or refrain from sexual intercourse.     It is very rare for insurance to pay for this and it typically costs around $200 per month. Please check out the website www.qsymia.com and sign up for the Pharmacy savings program to save $75 a month for 12 months if eligible. The medication can also be paid for out of pocket. It may require a prior authorization which could take up to 1-2 weeks.      In order to get refills of this or any medication we prescribe you must be seen in the medical weight mgmt clinic every 2-4 months. Please have your pharmacy fax a refill request to [phone].      Sincerely,    Gina Carlson PA-C    I spent a total of 60 minutes face to face with Marcy during today's office visit. Over 50% of this time was spent counseling the patient and/or coordinating care.

## 2018-11-08 NOTE — MR AVS SNAPSHOT
MRN:[id number]                      After Visit Summary   11/8/2018    Marcy Mitchell    MRN: [id number]           Visit Information        Provider Department      11/8/2018 8:30 AM Alicia Artis RD [facility] Surgical Weight Management        Your next 10 appointments already scheduled     Nov 08, 2018  9:20 AM CST   (Arrive by 9:05 AM)   New Re-establish Care with Redd Nix MD   [facility] Surgical Weight Management ([facility])    [address] [place] [id number] [phone]              Care Instructions    Goal:   1.3 meals daily, use protein shake or bar if busy  2. 48-64 of water/fluids daily   3. If snack need: veggies or fruit   4. Try crock-pot meal with soft tender meat     *Protein Shake Criteria: no more than 210 Calories, at least 20 grams of protein, and less than 10 grams of sugar     Meal Replacement Shake Options:   [facility] smoothie (160 Calories, 20 g protein)   Premier Protein (160 Calories, 30 g protein)  Slim Fast Advanced Nutrition (180 Calories, 20 g protein)  Muscle Milk, lactose-free, 17 oz bottle (210 Calories, 30 g protein)  Integrated Supplements, no artificial sugars (110 Calories, 20 g protein)  Quest Protein Bars (190 Calories, 20 g protein)  No Cow Protein Bar, gluten, dairy, and soy free (200 Calories, 20 g protein)    Clear Liquid options:  BiPro  Premier Protein clear  [id number]  [facility] Protein 15 Concentrate    Frozen Meal Replacements  Healthy Choice  Lean Cuisine  Atkins Meals  Smart Ones     Follow up with RD in 2-3 months    Alicia Artis, MS, RD, LD  If you need to schedule or reschedule with a dietitian please call [phone].         [employer] System[employer]art Information     "[employer] gives you secure access to your electronic health record. If you see a primary care provider, you can also send messages to your care team and make appointments. If you have questions, please call your primary care  clinic.  If you do not have a primary care provider, please call [phone] and they will assist you.      [employer] is an electronic gateway that provides easy, online access to your medical records. With [employer], you can request a clinic appointment, read your test results, renew a prescription or communicate with your care team.     To access your existing account, please contact your [facility] or call [phone] for assistance.        Care EveryWhere ID     This is your Care EveryWhere ID. This could be used by other organizations to access your [facility] medical records  [id number]        Equal Access to Services     EUGENE AL : Mary Head, darek albarran, jihan jung, chiqui garcia. So [facility] [phone].    ATENCIÓN: Si habla español, tiene a xiao disposición servicios gratuitos de asistencia lingüística. Llame al [phone].    We comply with applicable federal civil rights laws and [place] laws. We do not discriminate on the basis of race, color, national origin, age, disability, sex, sexual orientation, or gender identity.

## 2018-11-08 NOTE — LETTER
"11/8/2018       RE: Marcy Mitchell  [address]  [place] [address]     Dear Colleague,    Thank you for referring your patient, aMrcy Mitchell, to the [facility] SURGICAL WEIGHT MANAGEMENT at [facility]. Please see a copy of my visit note below.    New Bariatric Nutrition Consultation Note    Reason For Visit: Nutrition Assessment    Marcy Mitchell is a 53 year old presenting today for new bariatric nutrition consult. Pt had Karen-Y-En in 2004 with Dr. Canseco at [facility]. Pt is accompanied by her . She is referred by Gina Carlson.      Support System Reviewed With Patient 11/8/2018   Who do you have in your support network that can be available to help you for the first 2 weeks after surgery? Yes   Who can you count on for support throughout your weight loss surgery journey? Spouse, family       ANTHROPOMETRICS:  Estimated body mass index is 51.01 kg/(m^2) as calculated from the following:    Height as of an earlier encounter on 11/8/18: 1.588 m (5' 2.5\").    Weight as of an earlier encounter on 11/8/18: 128.5 kg (283 lb 6.4 oz).       11/8/2018   I have tried the following methods to lose weight Watching portions or calories, Exercise, Weight Watchers, Atkins type diet (low carb/high protein), Pre packaged meals ex: Nutrisystem, Slimfast, Weight Loss Surgery, Physician directed program       Weight Loss Questions Reviewed With Patient 11/8/2018   How long have you been overweight? Since early childhood     SUPPLEMENT INFORMATION:  viT C, Vit D, [place] multivitamin 2x daily,  Vit B12, fish oil, flax seed oil, calcium (unsure of dosage)     NUTRITION HISTORY:  PT s/p Karne-en-Y in 2004 and was able to keep the weight off for 8 years when she started to have episodes of emesis. Pt had an EGD at 2013 that showed erosion/ulceration. During this, the pt put herself on a soft foods and liquid diet. She did gain weight despite the frequent episodes of vomiting. Her " EGD in 2018 found she had stricture dilation and candy cane blind limb. At this time she is not having difficulty eating or with episodes of emesis. She does report difficulties with eating certain foods such as meat.     Recall Diet Questions Reviewed With Patient 11/8/2018   Describe what you typically consume for breakfast (typical or most recent): Oatmeal, eggs   Describe what you typically consume for lunch (typical or most recent): Salad (spinach) , soup (chicken sausage and spinach)    Describe what you typically consume for supper (typical or most recent): Fish, soup, chicken   Describe what you typically consume as snacks (typical or most recent): Chips, popcorn, fruit, Cheetos, Halloween candy   How many ounces of water, or other low calorie drinks, do you drink daily (8 oz=1 glass)? 24 oz   How many ounces of caffeine (coffee, tea, pop) do you drink daily (8 oz=1 glass)? 16 oz   How often do you drink alcohol? Never   Beverages:  coffee (2 cups) - creamer (international delight vanilla) , little water 8-16 oz , OJ ~3-4 oz, 8oz skim milk     Eating Habits 11/8/2018   Do you have any dietary restrictions? No   Do you currently binge eat (eat a large amount of food in a short time)? No   Are you an emotional eater? Yes   Do you get up to eat after falling asleep? No   What foods do you crave? Carba       ADDITIONAL INFORMATION:    Dining Out History Reviewed With Patient 11/8/2018   How often do you dine out? Rarely.   Where do you dine out? (select all that apply) sit-down restaurants   What types of food do you order when you dine out? Variety       Physical Activity Reviewed With Patient 11/8/2018   How often do you exercise? Never   What keeps you from being more active?  Pain, Shortness of breath, Lack of Time       NUTRITION DIAGNOSIS:  Obesity r/t long history of self-monitoring deficit and excessive energy intake aeb BMI >30 kg/m2.    INTERVENTION:  Intervention Provided/Education Provided on  -  Vitamin and mineral supplements: taking calcium one in the morning and one at night. Gave and reviewed supplementation handout.   - Review diet guidelines for post-op/maintenance diet. Dicussed not skipping meals as the pt stated she may skip breakfast and lunch. Working on eating slower and decreases/choosing better snack options. Handout given and reviewed.   - Discussed her intolerance of eating meat, recommended trying crock pot meals and eating slowly (20-30 minutes) and chewing well.   -Pt and  had question. All questions were answered at this time.     Questions Reviewed With Patient 11/8/2018   How ready are you to make changes regarding your weight? Number 1 = Not ready at all to make changes up to 10 = very ready. 10   How confident are you that you can change? 1 = Not confident that you will be successful making changes up to 10 = very confident. 10       Patient Understanding: good  Expected Compliance: good    GOALS:  1.3 meals daily, use protein shake or bar if busy  2. 48-64 of water/fluids daily   3. If snack need: veggies or fruit   4. Try crock-pot meal with soft tender meat     *Protein Shake Criteria: no more than 210 Calories, at least 20 grams of protein, and less than 10 grams of sugar     Meal Replacement Shake Options:   [employer] C smoothie (160 Calories, 20 g protein)   Premier Protein (160 Calories, 30 g protein)  Slim Fast Advanced Nutrition (180 Calories, 20 g protein)  Muscle Milk, lactose-free, 17 oz bottle (210 Calories, 30 g protein)  Integrated Supplements, no artificial sugars (110 Calories, 20 g protein)  Quest Protein Bars (190 Calories, 20 g protein)  No Cow Protein Bar, gluten, dairy, and soy free (200 Calories, 20 g protein)    Clear Liquid options:  BiPro  Premier Protein clear  [id number]  [employer] Protein 15 Concentrate    Frozen Meal Replacements  Healthy Choice  Lean Cuisine  Atkins Meals  Smart Ones     Follow-Up:   Recommend 2-3 follow up visits to assist with  lifestyle changes or per insurance.      Time spent with patient: 30 minutes.  Alicia Artis, MS, RD, LD                 Again, thank you for allowing me to participate in the care of your patient.      Sincerely,    Alicia Artis RD

## 2018-11-08 NOTE — LETTER
"11/8/2018       RE: Marcy Mitchell  [address]  [place] [address]     Dear Colleague,    Thank you for referring your patient, Marcy Mitchell, to the [facility] SURGICAL WEIGHT MANAGEMENT at [facility]. Please see a copy of my visit note below.    I saw Marcy in clinic for issues related to prior gastric bypass surgery.    Has had evaluation by Dr. Bosch at [facility] along with Dr. Kanu Huang, who did upper endoscopy.    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 Huang.    Her weight history includes having lost over 90 lbs with the gastric bypass and regaining a lot of the weight due, in part, to forced maladaptive eating syndrome.    She has difficulty eating some solid foods such as meat.    Nonsmoker since 2004.  No NSAID use.    Omeprazole use 40 mg twice daily started recently.    /77  Pulse 66  Temp 98  F (36.7  C) (Oral)  Ht 1.588 m (5' 2.5\")  Wt 128.5 kg (283 lb 6.4 oz)  SpO2 99%  BMI 51.01 kg/m2  NAD  Abd includes laparoscopy incisions.          Plan upper endoscopy by me to reevaluate for ulcer disease.    Seems to have an operation that remains intact, but some problems with dietary noncompliance over the years.  May have had problematic ulcer disease as the basis for her troubles.    Redd Nix MD  Surgery  [phone] (hospital )  [phone] (clinic nurses)            Again, thank you for allowing me to participate in the care of your patient.      Sincerely,    Redd Nix MD      "

## 2018-11-09 ENCOUNTER — TELEPHONE (OUTPATIENT)
Dept: SURGERY | Facility: CLINIC | Age: 53
End: 2018-11-09

## 2018-11-09 ENCOUNTER — TELEPHONE (OUTPATIENT)
Dept: GASTROENTEROLOGY | Facility: CLINIC | Age: 53
End: 2018-11-09

## 2018-11-10 LAB
ANNOTATION COMMENT IMP: NORMAL
RETINYL PALMITATE SERPL-MCNC: <0.02 MG/L (ref 0–0.1)
VIT A SERPL-MCNC: 0.46 MG/L (ref 0.3–1.2)

## 2018-11-12 ENCOUNTER — TELEPHONE (OUTPATIENT)
Dept: GASTROENTEROLOGY | Facility: CLINIC | Age: 53
End: 2018-11-12

## 2018-11-12 NOTE — TELEPHONE ENCOUNTER
Central Prior Authorization Team   Phone: [phone]      PA Initiation    Medication: Phentermine-Topiramate 7.5-46 MG CP24-PA initiated  Insurance Company: [employer] - Phone [phone] Fax [phone]  Pharmacy Filling the Rx: [id number] - [place], [place] - [address]  Filling Pharmacy Phone: [phone]  Filling Pharmacy Fax:    Start Date: 11/12/2018

## 2018-11-12 NOTE — TELEPHONE ENCOUNTER
PRIOR AUTHORIZATION DENIED    Medication: Phentermine-Topiramate 7.5-46 MG CP24-PA denied    Denial Date: 11/12/2018    Denial Rational: Patient must try/fail 3 of the following: Belviq, Belviq XR, Contrave, Saxenda        Appeal Information:

## 2018-11-14 ENCOUNTER — HEALTH MAINTENANCE LETTER (OUTPATIENT)
Age: 53
End: 2018-11-14

## 2018-11-21 ENCOUNTER — TELEPHONE (OUTPATIENT)
Dept: GASTROENTEROLOGY | Facility: CLINIC | Age: 53
End: 2018-11-21

## 2018-11-26 NOTE — PROGRESS NOTES
"I saw Marcy in clinic for issues related to prior gastric bypass surgery.    Has had evaluation by Dr. Bosch at [facility] along with Dr. Kanu Huang, who did upper endoscopy.    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 Huang.    Her weight history includes having lost over 90 lbs with the gastric bypass and regaining a lot of the weight due, in part, to forced maladaptive eating syndrome.    She has difficulty eating some solid foods such as meat.    Nonsmoker since 2004.  No NSAID use.    Omeprazole use 40 mg twice daily started recently.    /77  Pulse 66  Temp 98  F (36.7  C) (Oral)  Ht 1.588 m (5' 2.5\")  Wt 128.5 kg (283 lb 6.4 oz)  SpO2 99%  BMI 51.01 kg/m2  NAD  Abd includes laparoscopy incisions.          Plan upper endoscopy by me to reevaluate for ulcer disease.    Seems to have an operation that remains intact, but some problems with dietary noncompliance over the years.  May have had problematic ulcer disease as the basis for her troubles.    Redd Nix MD  Surgery  [phone] (hospital )  [phone] (clinic nurses)          "

## 2018-11-28 ENCOUNTER — HOSPITAL ENCOUNTER (OUTPATIENT)
Facility: CLINIC | Age: 53
Discharge: HOME | End: 2018-11-28
Attending: SURGERY | Admitting: SURGERY
Payer: COMMERCIAL

## 2018-11-28 VITALS
SYSTOLIC BLOOD PRESSURE: 121 MMHG | DIASTOLIC BLOOD PRESSURE: 62 MMHG | OXYGEN SATURATION: 99 % | RESPIRATION RATE: 17 BRPM

## 2018-11-28 DIAGNOSIS — E66.01 MORBID (SEVERE) OBESITY DUE TO EXCESS CALORIES (H): Primary | ICD-10-CM

## 2018-11-28 LAB — UPPER GI ENDOSCOPY: NORMAL

## 2018-11-28 PROCEDURE — 43235 EGD DIAGNOSTIC BRUSH WASH: CPT | Performed by: SURGERY

## 2018-11-28 PROCEDURE — 25000128 H RX IP 250 OP 636: Performed by: SURGERY

## 2018-11-28 PROCEDURE — G0500 MOD SEDAT ENDO SERVICE >5YRS: HCPCS | Performed by: SURGERY

## 2018-11-28 PROCEDURE — 25000125 ZZHC RX 250: Performed by: SURGERY

## 2018-11-28 PROCEDURE — 25000132 ZZH RX MED GY IP 250 OP 250 PS 637: Performed by: SURGERY

## 2018-11-28 RX ORDER — LIDOCAINE 40 MG/G
CREAM TOPICAL
Status: DISCONTINUED | OUTPATIENT
Start: 2018-11-28 | End: 2018-11-28 | Stop reason: HOSPADM

## 2018-11-28 RX ORDER — ONDANSETRON 2 MG/ML
4 INJECTION INTRAMUSCULAR; INTRAVENOUS EVERY 6 HOURS PRN
Status: CANCELLED | OUTPATIENT
Start: 2018-11-28

## 2018-11-28 RX ORDER — ONDANSETRON 2 MG/ML
4 INJECTION INTRAMUSCULAR; INTRAVENOUS
Status: DISCONTINUED | OUTPATIENT
Start: 2018-11-28 | End: 2018-11-28 | Stop reason: HOSPADM

## 2018-11-28 RX ORDER — METOCLOPRAMIDE 10 MG/1
10 TABLET ORAL EVERY 6 HOURS PRN
Status: CANCELLED | OUTPATIENT
Start: 2018-11-28

## 2018-11-28 RX ORDER — ONDANSETRON 4 MG/1
4 TABLET, ORALLY DISINTEGRATING ORAL EVERY 6 HOURS PRN
Status: CANCELLED | OUTPATIENT
Start: 2018-11-28

## 2018-11-28 RX ORDER — FLUMAZENIL 0.1 MG/ML
0.2 INJECTION, SOLUTION INTRAVENOUS
Status: CANCELLED | OUTPATIENT
Start: 2018-11-28 | End: 2018-11-29

## 2018-11-28 RX ORDER — METOCLOPRAMIDE HYDROCHLORIDE 5 MG/ML
10 INJECTION INTRAMUSCULAR; INTRAVENOUS EVERY 6 HOURS PRN
Status: CANCELLED | OUTPATIENT
Start: 2018-11-28

## 2018-11-28 RX ORDER — PROCHLORPERAZINE MALEATE 10 MG
10 TABLET ORAL EVERY 6 HOURS PRN
Status: CANCELLED | OUTPATIENT
Start: 2018-11-28

## 2018-11-28 RX ORDER — FENTANYL CITRATE 50 UG/ML
INJECTION, SOLUTION INTRAMUSCULAR; INTRAVENOUS PRN
Status: DISCONTINUED | OUTPATIENT
Start: 2018-11-28 | End: 2018-11-28 | Stop reason: HOSPADM

## 2018-11-28 RX ORDER — SIMETHICONE
LIQUID (ML) MISCELLANEOUS PRN
Status: DISCONTINUED | OUTPATIENT
Start: 2018-11-28 | End: 2018-11-28 | Stop reason: HOSPADM

## 2018-11-28 RX ORDER — NALOXONE HYDROCHLORIDE 0.4 MG/ML
.1-.4 INJECTION, SOLUTION INTRAMUSCULAR; INTRAVENOUS; SUBCUTANEOUS
Status: CANCELLED | OUTPATIENT
Start: 2018-11-28 | End: 2018-11-29

## 2018-11-28 NOTE — IP AVS SNAPSHOT
[facility], [place], Endoscopy    [address] [address]    Phone:  [phone]                                       After Visit Summary   11/28/2018    Marcy Mitchell    MRN: [id number]           After Visit Summary Signature Page     I have received my discharge instructions, and my questions have been answered. I have discussed any challenges I see with this plan with the nurse or doctor.    ..........................................................................................................................................  Patient/Patient Representative Signature      ..........................................................................................................................................  Patient Representative Print Name and Relationship to Patient    ..................................................               ................................................  Date                                   Time    ..........................................................................................................................................  Reviewed by Signature/Title    ...................................................              ..............................................  Date                                               Time          22EPIC Rev 08/18

## 2018-11-28 NOTE — IP AVS SNAPSHOT
MRN:[id number]                      After Visit Summary   11/28/2018    Marcy Mitchell    MRN: [id number]           Thank you!     Thank you for choosing [facility] for your care. Our goal is always to provide you with excellent care. Hearing back from our patients is one way we can continue to improve our services. Please take a few minutes to complete the written survey that you may receive in the mail after you visit with us. Thank you!        Patient Information     Date Of Birth          1965        About your hospital stay     You were admitted on:  November 28, 2018 You last received care in the:  [facility], Endoscopy    You were discharged on:  November 28, 2018       Who to Call     For medical emergencies, please call 911.  For non-urgent questions about your medical care, please call your primary care provider or clinic, None  For questions related to your surgery, please call your surgery clinic        Attending Provider     Provider Redd Guillory MD Surgery       Primary Care Provider Fax #    Physician No Ref-Primary [phone]      Further instructions from your care team       Discharge Instructions after  Upper Endoscopy (EGD)    Activity and Diet  You were given medicine for pain. You may be dizzy or sleepy.  For 24 hours:    Do not drive or use heavy equipment.    Do not make important decisions.    Do not drink any alcohol.  You may return to your regular diet.    Discomfort  You may have a sore throat for 2 to 3 days. It may help to:    Avoid hot liquids for 24 hours.    Use sore throat lozenges.    Gargle as needed with salt water up to 4 times a day. Mix 1 cup of warm water  with 1 teaspoon of salt. Do not swallow.    You may take Tylenol (acetaminophen) for pain unless your doctor has told you not to.    Do not take aspirin or ibuprofen (Advil, Motrin) or other NSAIDS  (anti-inflammatory drugs) for 1 days.    Follow-up  Per Dr. Nix      When to call us:  Problems are rare. Call right away if you have:    Unusual throat pain or trouble swallowing    Unusual pain in belly or chest that is not relieved by belching or passing air    Black stools (tar-like looking bowel movement)    Temperature above 100.6  F. (37.5  C).    If you vomit blood or have severe pain, go to an emergency room.    If you have questions, call:  Monday to Friday, 7 a.m. to 4:30 p.m.: Endoscopy: [phone] (We may have to call you back)    After hours: Hospital: [phone] (Ask for the GI fellow on call)    Pending Results     No orders found from 11/26/2018 to 11/29/2018.            Admission Information     Date & Time Provider Department Dept. Phone    11/28/2018 Redd Nix MD [facility], [place], Endoscopy [phone]      Your Vitals Were     Blood Pressure Respirations Pulse Oximetry             127/77 11 100%         MyChart Information     [employer] gives you secure access to your electronic health record. If you see a primary care provider, you can also send messages to your care team and make appointments. If you have questions, please call your primary care clinic.  If you do not have a primary care provider, please call [phone] and they will assist you.        Care EveryWhere ID     This is your Care EveryWhere ID. This could be used by other organizations to access your [facility] medical records  [id number]        Equal Access to Services     EUGENE AL : Hadii marion leon hadasho Soomaali, waaxda luqadaha, qaybta kaalmada adeegyada, chiqui garcia. So [facility] [phone].    ATENCIÓN: Si habla español, tiene a xiao disposición servicios gratuitos de asistencia lingüística. Llame al [phone].    We comply with applicable federal civil rights laws and [place] laws. We do not discriminate on the basis of race, color, national origin, age, disability, sex, sexual orientation, or gender identity.               Review of your  medicines      UNREVIEWED medicines. Ask your doctor about these medicines        Dose / Directions    ascorbic acid 500 MG Tabs        Indications: PN:   Refills:  0       cetirizine 10 MG tablet   Commonly known as:  zyrTEC        Dose:  10 mg   Take 10 mg by mouth   Refills:  0       ciprofloxacin 500 MG tablet   Commonly known as:  CIPRO   Used for:  S/P gastric bypass, Bacterial overgrowth syndrome        Dose:  500 mg   Take 1 tablet (500 mg) by mouth 2 times daily   Quantity:  20 tablet   Refills:  0       fish oil-omega-3 fatty acids 1000 MG capsule        Refills:  0       fluocinonide 0.05 % external ointment   Commonly known as:  LIDEX        Apply twice daily until clear, then once daily x 2 wks, then qod x 2 wks   Refills:  0       Linseed Oil Oil        by Misc.(Non-Drug; Combo Route) route.   Refills:  0       metroNIDAZOLE 500 MG tablet   Commonly known as:  FLAGYL   Used for:  Bacterial overgrowth syndrome, S/P gastric bypass        Dose:  500 mg   Take 1 tablet (500 mg) by mouth 2 times daily   Quantity:  20 tablet   Refills:  0       omeprazole 40 MG DR capsule   Commonly known as:  priLOSEC        Dose:  40 mg   Take 40 mg by mouth   Refills:  0       Phentermine-Topiramate 7.5-46 MG Cp24   Used for:  Morbid obesity (H)        Dose:  1 capsule   Take 1 capsule by mouth daily   Quantity:  30 capsule   Refills:  3       THERAPEUTIC MULTIVIT/MINERAL tablet        Refills:  0       vitamin B-12 1000 MCG tablet   Commonly known as:  CYANOCOBALAMIN        Dose:  1000 mcg   Take 1,000 mcg by mouth   Refills:  0       vitamin D3 2000 units Caps        Refills:  0                Protect others around you: Learn how to safely use, store and throw away your medicines at www.disposemymeds.org.             Medication List: This is a list of all your medications and when to take them. Check marks below indicate your daily home schedule. Keep this list as a reference.      Medications           Morning  Afternoon Evening Bedtime As Needed    ascorbic acid 500 MG Tabs   Indications: PN:                                cetirizine 10 MG tablet   Commonly known as:  zyrTEC   Take 10 mg by mouth                                ciprofloxacin 500 MG tablet   Commonly known as:  CIPRO   Take 1 tablet (500 mg) by mouth 2 times daily                                fish oil-omega-3 fatty acids 1000 MG capsule                                fluocinonide 0.05 % external ointment   Commonly known as:  LIDEX   Apply twice daily until clear, then once daily x 2 wks, then qod x 2 wks                                Linseed Oil Oil   by Misc.(Non-Drug; Combo Route) route.                                metroNIDAZOLE 500 MG tablet   Commonly known as:  FLAGYL   Take 1 tablet (500 mg) by mouth 2 times daily                                omeprazole 40 MG DR capsule   Commonly known as:  priLOSEC   Take 40 mg by mouth                                Phentermine-Topiramate 7.5-46 MG Cp24   Take 1 capsule by mouth daily                                THERAPEUTIC MULTIVIT/MINERAL tablet                                vitamin B-12 1000 MCG tablet   Commonly known as:  CYANOCOBALAMIN   Take 1,000 mcg by mouth                                vitamin D3 2000 units Caps

## 2018-11-28 NOTE — DISCHARGE INSTRUCTIONS
Discharge Instructions after  Upper Endoscopy (EGD)    Activity and Diet  You were given medicine for pain. You may be dizzy or sleepy.  For 24 hours:    Do not drive or use heavy equipment.    Do not make important decisions.    Do not drink any alcohol.  You may return to your regular diet.    Discomfort  You may have a sore throat for 2 to 3 days. It may help to:    Avoid hot liquids for 24 hours.    Use sore throat lozenges.    Gargle as needed with salt water up to 4 times a day. Mix 1 cup of warm water  with 1 teaspoon of salt. Do not swallow.    You may take Tylenol (acetaminophen) for pain unless your doctor has told you not to.    Do not take aspirin or ibuprofen (Advil, Motrin) or other NSAIDS  (anti-inflammatory drugs) for 1 days.    Follow-up  Per Dr. Nix     When to call us:  Problems are rare. Call right away if you have:    Unusual throat pain or trouble swallowing    Unusual pain in belly or chest that is not relieved by belching or passing air    Black stools (tar-like looking bowel movement)    Temperature above 100.6  F. (37.5  C).    If you vomit blood or have severe pain, go to an emergency room.    If you have questions, call:  Monday to Friday, 7 a.m. to 4:30 p.m.: Endoscopy: [phone] (We may have to call you back)    After hours: Hospital: [phone] (Ask for the GI fellow on call)

## 2018-11-28 NOTE — BRIEF OP NOTE
[facility], [place]    Brief Operative Note    Pre-operative diagnosis: S/P gastric bypass  Post-operative diagnosis S/p gastirc bypass  Procedure: Procedure(s):  COMBINED ESOPHAGOSCOPY, GASTROSCOPY, DUODENOSCOPY (EGD)  Surgeon: Surgeon(s) and Role:     * Redd Nix MD - Primary  Anesthesia: Conscious Sedation   Estimated blood loss: Minimal  Drains: None  Specimens: * No specimens in log *  Findings:   enlarged gastric pouch, no wevidence of ulcer disease.  Complications: None.  Implants: None    200-300cc gastric pouch  Discharge to home  Referral for [facility].

## 2020-03-11 ENCOUNTER — HEALTH MAINTENANCE LETTER (OUTPATIENT)
Age: 55
End: 2020-03-11

## 2020-06-05 NOTE — PROGRESS NOTES
"Marcy Mitchell is a 54 year old female who is being evaluated via a billable telephone visit.      The patient has been notified of following:     \"This telephone visit will be conducted via a call between you and your physician/provider. We have found that certain health care needs can be provided without the need for a physical exam.  This service lets us provide the care you need with a short phone conversation.  If a prescription is necessary we can send it directly to your pharmacy.  If lab work is needed we can place an order for that and you can then stop by our lab to have the test done at a later time.    Telephone visits are billed at different rates depending on your insurance coverage. During this emergency period, for some insurers they may be billed the same as an in-person visit.  Please reach out to your insurance provider with any questions.    If during the course of the call the physician/provider feels a telephone visit is not appropriate, you will not be charged for this service.\"    Patient has given verbal consent for Telephone visit?  Yes    What phone number would you like to be contacted at? [phone]    How would you like to obtain your AVS? Tiempyhart    Phone call duration: 15 minutes    Aby Ruiz MD        SUBJECTIVE:                                                   Marcy Mitchell is a 54 year old female who presents to clinic today for the following health issue(s):  Patient presents with:  Consult: Patient states she hit menopause around 48. patient has noticed it the pain during intercourse since 3 years ago but it has gotten painful enough it ronal paitient from it. Patient stated she does have vaginal dryness.       HPI:  54 year old female presents with vaginal pain with intercourse.  She is having pain with entry.  She is having more vaginal dryness.  She isn't having any bleeding with intercourse.      She doesn't get migraine headaches.  She doesn't have any " elevated blood pressures.  She hasn't had any blood clot history.    LMP- was 49.      No LMP recorded. Patient is postmenopausal..     Patient is sexually active, No obstetric history on file..  Using menopause for contraception.    reports that she quit smoking about 15 years ago. Her smoking use included cigarettes. She has a 40.00 pack-year smoking history. She has never used smokeless tobacco.    STD testing offered?  Declined    Health maintenance updated:  yes    Today's PHQ-2 Score:   PHQ-2 ( 1999 [employer]) 6/8/2020   Q1: Little interest or pleasure in doing things 0   Q2: Feeling down, depressed or hopeless 0   PHQ-2 Score 0     Today's PHQ-9 Score:   PHQ-9 SCORE 11/8/2018   PHQ-9 Total Score MyChart 9 (Mild depression)   PHQ-9 Total Score 9     Today's MIRNA-7 Score: No flowsheet data found.    Problem list and histories reviewed & adjusted, as indicated.  Additional history: as documented.    There is no problem list on file for this patient.    Past Surgical History:   Procedure Laterality Date     ABDOMEN SURGERY      Rny and cholecystectomy     CHOLECYSTECTOMY       COLONOSCOPY       ESOPHAGOSCOPY, GASTROSCOPY, DUODENOSCOPY (EGD), COMBINED N/A 11/28/2018    Procedure: COMBINED ESOPHAGOSCOPY, GASTROSCOPY, DUODENOSCOPY (EGD);  Surgeon: Redd Nix MD;  Location: [facility]      Social History     Tobacco Use     Smoking status: Former Smoker     Packs/day: 2.00     Years: 20.00     Pack years: 40.00     Types: Cigarettes     Last attempt to quit: 11/2/2004     Years since quitting: 15.6     Smokeless tobacco: Never Used   Substance Use Topics     Alcohol use: Yes     Comment: rare      Problem (# of Occurrences) Relation (Name,Age of Onset)    Anxiety Disorder (1) Sister (Augusta Chamberlain)    Asthma (1) Sister (Augusta Chamberlain)    Cerebrovascular Disease (1) Maternal Grandmother (Leslie Bran)    Crohn's Disease (1) Nephew (Richard Alfonso)    Depression (1) Sister (Augusta Chamberlain)    Diabetes (1) Sister  (Lynn ponce)    Hyperlipidemia (3) Sister (Lynn ponce), Sister (Augusta Chamberlain), Father (Harley Chamberlain)    Hypertension (1) Sister (Lynn ponce)    Mental Illness (1) Sister (Augusta Chamberlain)    Obesity (2) Sister (Lynn ponce), Sister (Augusta Chamberlain)    Other Cancer (1) Mother (Nicole Chamberlain): Adrenal    Prostate Cancer (1) Father (Harley Chamberlain)            Current Outpatient Medications   Medication Sig     ascorbic acid 500 MG TABS Indications: PN:       cetirizine (ZYRTEC) 10 MG tablet Take 10 mg by mouth     Cholecalciferol (VITAMIN D3) 2000 units CAPS      cyanocobalamin (VITAMIN  B-12) 1000 MCG tablet Take 1,000 mcg by mouth     Multiple Vitamins-Minerals (THERAPEUTIC MULTIVIT/MINERAL) TABS      omeprazole (PRILOSEC) 40 MG capsule Take 40 mg by mouth     No current facility-administered medications for this visit.      Allergies   Allergen Reactions     Codeine      Other reaction(s): Gastrointestinal  Vomiting, tylenol #3 after wisdom teeth extraction     Dust Mites      Mold      Nsaids Other (See Comments)     Unable to take       Ragweeds      Seasonal Allergies              OBJECTIVE:     Breastfeeding No   There is no height or weight on file to calculate BMI.    Exam:  Constitutional: normal mentation    In-Clinic Test Results:  No results found for this or any previous visit (from the past 24 hour(s)).    ASSESSMENT/PLAN:                                                        ICD-10-CM    1. Vaginal atrophy  N95.2        There are no Patient Instructions on file for this visit.    1. Vaginal atrophy-  Discussed plan of care.  3 times a week for estrogen cream.  Discussed how to place it.  Discussed try 3-4 weeks and have another appt.  Discussed some times will increase dosing.  She will come in in July to evaluate.    Aby Ruiz MD  [facility] [place]

## 2020-06-08 ENCOUNTER — VIRTUAL VISIT (OUTPATIENT)
Dept: OBGYN | Facility: CLINIC | Age: 55
End: 2020-06-08
Payer: COMMERCIAL

## 2020-06-08 DIAGNOSIS — N95.2 VAGINAL ATROPHY: Primary | ICD-10-CM

## 2020-06-08 PROCEDURE — 99202 OFFICE O/P NEW SF 15 MIN: CPT | Mod: TEL | Performed by: OBSTETRICS & GYNECOLOGY

## 2020-06-08 RX ORDER — ESTRADIOL 0.1 MG/G
2 CREAM VAGINAL
Qty: 32 G | Refills: 3 | Status: SHIPPED | OUTPATIENT
Start: 2020-06-08 | End: 2020-07-27

## 2020-06-08 NOTE — Clinical Note
Please abstract the following data from this visit with this patient into the appropriate field in Epic:    Tests that can be patient reported without a hard copy:    {Quality Abstract List (Optional):210158}    Other Tests found in the patient's chart through Chart Review/Care Everywhere:    Colonoscopy done by this group [facility] on this date: 12/18/17. Repeat in 2022.    Note to Abstraction: If this section is blank, no results were found via Chart Review/Care Everywhere.

## 2020-06-08 NOTE — NURSING NOTE
Please abstract the following data from this visit with this patient into the appropriate field in Epic:    Tests that can be patient reported without a hard copy:        Other Tests found in the patient's chart through Chart Review/Care Everywhere:    Colonoscopy done by this group [facility] on this date: 12/18/17. Repeat in 2022.    Note to Abstraction: If this section is blank, no results were found via Chart Review/Care Everywhere.

## 2020-07-10 NOTE — PROGRESS NOTES
Marcy is a 54 year old  female who presents for annual exam.     Besides routine health maintenance, she has no other health concerns today .    HPI:  The patient does not have a PCP.  She has been using estrace cream.  She has had improved pain with intercourse.      LMP- 2014      GYNECOLOGIC HISTORY:    No LMP recorded. Patient is postmenopausal.      Her current contraception method is: menopause.  She  reports that she quit smoking about 15 years ago. Her smoking use included cigarettes. She has a 40.00 pack-year smoking history. She has never used smokeless tobacco.    Patient is sexually active.  STD testing offered?  Declined  Last PHQ-9 score on record =   PHQ-9 SCORE 2020   PHQ-9 Total Score MyChart -   PHQ-9 Total Score 1     Last GAD7 score on record =   MIRNA-7 SCORE 2020   Total Score 0     Alcohol Score = 1    HEALTH MAINTENANCE:  Cholesterol:  Long ago  Last Mammo: , Result: Normal, Next Mammo: needs to schedule  Pap:   Colonoscopy:  17, Result: polyp, Next Colonoscopy: .  Dexa:  never    Health maintenance updated:  no, will schedule mammo    HISTORY:  OB History    Para Term  AB Living   1 0 0 0 1 0   SAB TAB Ectopic Multiple Live Births   1 0 0 0 0      # Outcome Date GA Lbr Galo/2nd Weight Sex Delivery Anes PTL Lv   1 SAB                There is no problem list on file for this patient.    Past Surgical History:   Procedure Laterality Date     ABDOMEN SURGERY      Rny and cholecystectomy     CHOLECYSTECTOMY       COLONOSCOPY       ESOPHAGOSCOPY, GASTROSCOPY, DUODENOSCOPY (EGD), COMBINED N/A 2018    Procedure: COMBINED ESOPHAGOSCOPY, GASTROSCOPY, DUODENOSCOPY (EGD);  Surgeon: Redd Nix MD;  Location:  GI      Social History     Tobacco Use     Smoking status: Former Smoker     Packs/day: 2.00     Years: 20.00     Pack years: 40.00     Types: Cigarettes     Last attempt to quit: 2004     Years since quitting: 15.7      "Smokeless tobacco: Never Used   Substance Use Topics     Alcohol use: Yes     Comment: rare      Problem (# of Occurrences) Relation (Name,Age of Onset)    Anxiety Disorder (1) Sister (Augusta Chamberlain)    Asthma (1) Sister (Augusta Chamberlain)    Cerebrovascular Disease (1) Maternal Grandmother (Leslie Bran)    Crohn's Disease (1) Nephew (Richard Alfonso)    Depression (1) Sister (Augusta Chamberlain)    Diabetes (1) Sister (Lynn ponce)    Hyperlipidemia (3) Sister (Lynn ponce), Sister (Augusta Chamberlain), Father (Harley Chamberlain)    Hypertension (1) Sister (Lynn ponce)    Mental Illness (1) Sister (Auugsta Chamberlain)    Obesity (2) Sister (Lynn ponce), Sister (Augusta Chamberlain)    Other Cancer (1) Mother (Nicole Chamberlain): Adrenal    Prostate Cancer (1) Father (Harley Chamberlain)            Current Outpatient Medications   Medication Sig     ascorbic acid 500 MG TABS Indications: PN:       cetirizine (ZYRTEC) 10 MG tablet Take 10 mg by mouth     Cholecalciferol (VITAMIN D3) 2000 units CAPS      cyanocobalamin (VITAMIN  B-12) 1000 MCG tablet Take 1,000 mcg by mouth     estradiol (ESTRACE) 0.1 MG/GM vaginal cream Place 2 g vaginally twice a week     Multiple Vitamins-Minerals (THERAPEUTIC MULTIVIT/MINERAL) TABS      omeprazole (PRILOSEC) 40 MG capsule Take 40 mg by mouth     No current facility-administered medications for this visit.      Allergies   Allergen Reactions     Codeine      Other reaction(s): Gastrointestinal  Vomiting, tylenol #3 after wisdom teeth extraction     Dust Mites      Mold      Nsaids Other (See Comments)     Unable to take       Ragweeds      Seasonal Allergies        Past medical, surgical, social and family histories were reviewed and updated in EPIC.    ROS:   12 point review of systems negative other than symptoms noted below or in the HPI.  Gastrointestinal: Constipation and Heartburn  No urinary frequency or dysuria, bladder or kidney problems    EXAM:  /76   Pulse 84   Ht 1.6 m (5' 3\")  "  Wt 123.4 kg (272 lb)   BMI 48.18 kg/m     BMI: Body mass index is 48.18 kg/m .    PHYSICAL EXAM:  Constitutional:   Appearance: Well nourished, well developed, alert, in no acute distress  Neck:  Lymph Nodes:  No lymphadenopathy present    Thyroid:  Gland size normal, nontender, no nodules or masses present  on palpation  Chest:  Respiratory Effort:  Breathing unlabored  Cardiovascular:    Heart: Auscultation:  Regular rate, normal rhythm, no murmurs present  Breasts: Inspection of Breasts:  No lymphadenopathy present., Palpation of Breasts and Axillae:  No masses present on palpation, no breast tenderness., Axillary Lymph Nodes:  No lymphadenopathy present. and No nodularity, asymmetry or nipple discharge bilaterally.  Gastrointestinal:   Abdominal Examination:  Abdomen nontender to palpation, tone normal without rigidity or guarding, no masses present, umbilicus without lesions   Liver and Spleen:  No hepatomegaly present, liver nontender to palpation    Hernias:  No hernias present  Lymphatic: Lymph Nodes:  No other lymphadenopathy present  Skin:  General Inspection:  No rashes present, no lesions present, no areas of  discoloration  Neurologic:    Mental Status:  Oriented X3.  Normal strength and tone, sensory exam                grossly normal, mentation intact and speech normal.    Psychiatric:   Mentation appears normal and affect normal/bright.         Pelvic Exam:  External Genitalia:     Normal appearance for age, no discharge present, no tenderness present, no inflammatory lesions present, color normal  Vagina:     Normal vaginal vault without central or paravaginal defects, no discharge present, no inflammatory lesions present, no masses present  Bladder:     Nontender to palpation  Urethra:   Urethral Body:  Urethra palpation normal, urethra structural support normal   Urethral Meatus:  No erythema or lesions present  Cervix:     Appearance healthy, no lesions present, nontender to palpation, no  bleeding present  Uterus:     Uterus: firm, normal sized and nontender,   Adnexa:     No adnexal tenderness present, no adnexal masses present  Perineum:     Perineum within normal limits, no evidence of trauma, no rashes or skin lesions present  Anus:     Anus within normal limits, no hemorrhoids present  Inguinal Lymph Nodes:     No lymphadenopathy present  Pubic Hair:     Normal pubic hair distribution for age  Genitalia and Groin:     No rashes present, no lesions present, no areas of discoloration, no masses present      COUNSELING:   Reviewed preventive health counseling, as reflected in patient instructions    BMI: Body mass index is 48.18 kg/m .      ASSESSMENT:  54 year old female with satisfactory annual exam.    ICD-10-CM    1. Encounter for gynecological examination without abnormal finding  Z01.419 HPV High Risk Types DNA Cervical     Pap imaged thin layer screen with HPV - recommended age 30 - 65 years (select HPV order below)       PLAN:  1. Pap  2. Fasting labs  3. Mammogram  4. She is s/p colonoscopy 2017- 5 year follow up    Aby Ruiz MD

## 2020-07-14 ENCOUNTER — OFFICE VISIT (OUTPATIENT)
Dept: OBGYN | Facility: CLINIC | Age: 55
End: 2020-07-14
Payer: COMMERCIAL

## 2020-07-14 VITALS
BODY MASS INDEX: 48.2 KG/M2 | HEIGHT: 63 IN | WEIGHT: 272 LBS | HEART RATE: 84 BPM | DIASTOLIC BLOOD PRESSURE: 76 MMHG | SYSTOLIC BLOOD PRESSURE: 130 MMHG

## 2020-07-14 DIAGNOSIS — Z01.419 ENCOUNTER FOR GYNECOLOGICAL EXAMINATION WITHOUT ABNORMAL FINDING: Primary | ICD-10-CM

## 2020-07-14 PROCEDURE — 87624 HPV HI-RISK TYP POOLED RSLT: CPT | Performed by: OBSTETRICS & GYNECOLOGY

## 2020-07-14 PROCEDURE — 99396 PREV VISIT EST AGE 40-64: CPT | Performed by: OBSTETRICS & GYNECOLOGY

## 2020-07-14 PROCEDURE — G0145 SCR C/V CYTO,THINLAYER,RESCR: HCPCS | Performed by: OBSTETRICS & GYNECOLOGY

## 2020-07-14 ASSESSMENT — ANXIETY QUESTIONNAIRES
1. FEELING NERVOUS, ANXIOUS, OR ON EDGE: NOT AT ALL
5. BEING SO RESTLESS THAT IT IS HARD TO SIT STILL: NOT AT ALL
3. WORRYING TOO MUCH ABOUT DIFFERENT THINGS: NOT AT ALL
7. FEELING AFRAID AS IF SOMETHING AWFUL MIGHT HAPPEN: NOT AT ALL
6. BECOMING EASILY ANNOYED OR IRRITABLE: NOT AT ALL
GAD7 TOTAL SCORE: 0
2. NOT BEING ABLE TO STOP OR CONTROL WORRYING: NOT AT ALL

## 2020-07-14 ASSESSMENT — PATIENT HEALTH QUESTIONNAIRE - PHQ9
SUM OF ALL RESPONSES TO PHQ QUESTIONS 1-9: 1
5. POOR APPETITE OR OVEREATING: NOT AT ALL

## 2020-07-14 ASSESSMENT — MIFFLIN-ST. JEOR: SCORE: 1807.91

## 2020-07-15 ASSESSMENT — ANXIETY QUESTIONNAIRES: GAD7 TOTAL SCORE: 0

## 2020-07-16 LAB
COPATH REPORT: NORMAL
PAP: NORMAL

## 2020-07-20 LAB
FINAL DIAGNOSIS: NORMAL
HPV HR 12 DNA CVX QL NAA+PROBE: NEGATIVE
HPV16 DNA SPEC QL NAA+PROBE: NEGATIVE
HPV18 DNA SPEC QL NAA+PROBE: NEGATIVE
SPECIMEN DESCRIPTION: NORMAL
SPECIMEN SOURCE CVX/VAG CYTO: NORMAL

## 2020-07-24 ENCOUNTER — ANCILLARY PROCEDURE (OUTPATIENT)
Dept: MAMMOGRAPHY | Facility: CLINIC | Age: 55
End: 2020-07-24
Payer: COMMERCIAL

## 2020-07-24 ENCOUNTER — MYC MEDICAL ADVICE (OUTPATIENT)
Dept: OBGYN | Facility: CLINIC | Age: 55
End: 2020-07-24

## 2020-07-24 DIAGNOSIS — R73.09 INCREASED GLUCOSE LEVEL: Primary | ICD-10-CM

## 2020-07-24 DIAGNOSIS — N95.2 VAGINAL ATROPHY: ICD-10-CM

## 2020-07-24 DIAGNOSIS — Z12.31 VISIT FOR SCREENING MAMMOGRAM: ICD-10-CM

## 2020-07-24 DIAGNOSIS — Z01.419 ENCOUNTER FOR GYNECOLOGICAL EXAMINATION WITHOUT ABNORMAL FINDING: ICD-10-CM

## 2020-07-24 LAB
CHOLEST SERPL-MCNC: 193 MG/DL
GLUCOSE BLD-MCNC: 105 MG/DL (ref 70–99)
HBA1C MFR BLD: 5.3 % (ref 0–5.6)
HDLC SERPL-MCNC: 52 MG/DL
LDLC SERPL CALC-MCNC: 115 MG/DL
NONHDLC SERPL-MCNC: 141 MG/DL
TRIGL SERPL-MCNC: 129 MG/DL

## 2020-07-24 PROCEDURE — 80061 LIPID PANEL: CPT | Performed by: OBSTETRICS & GYNECOLOGY

## 2020-07-24 PROCEDURE — 83036 HEMOGLOBIN GLYCOSYLATED A1C: CPT | Performed by: OBSTETRICS & GYNECOLOGY

## 2020-07-24 PROCEDURE — 77067 SCR MAMMO BI INCL CAD: CPT | Mod: TC

## 2020-07-24 PROCEDURE — 82947 ASSAY GLUCOSE BLOOD QUANT: CPT | Performed by: OBSTETRICS & GYNECOLOGY

## 2020-07-24 PROCEDURE — 36415 COLL VENOUS BLD VENIPUNCTURE: CPT | Performed by: OBSTETRICS & GYNECOLOGY

## 2020-07-27 RX ORDER — ESTRADIOL 0.1 MG/G
2 CREAM VAGINAL
Qty: 32 G | Refills: 3 | Status: SHIPPED | OUTPATIENT
Start: 2020-07-27 | End: 2021-07-27

## 2020-07-27 NOTE — TELEPHONE ENCOUNTER
7/14/20 annual exam  Rx for estradiol 0.1 mg/gm vaginal cream  Rx transfer request to mail order  rx sent  Nerissa Chin RN on 7/27/2020 at 8:28 AM

## 2021-01-03 ENCOUNTER — HEALTH MAINTENANCE LETTER (OUTPATIENT)
Age: 56
End: 2021-01-03

## 2021-03-30 ENCOUNTER — IMMUNIZATION (OUTPATIENT)
Dept: NURSING | Facility: CLINIC | Age: 56
End: 2021-03-30
Payer: COMMERCIAL

## 2021-03-30 PROCEDURE — 91300 PR COVID VAC PFIZER DIL RECON 30 MCG/0.3 ML IM: CPT

## 2021-03-30 PROCEDURE — 0001A PR COVID VAC PFIZER DIL RECON 30 MCG/0.3 ML IM: CPT

## 2021-04-20 ENCOUNTER — IMMUNIZATION (OUTPATIENT)
Dept: NURSING | Facility: CLINIC | Age: 56
End: 2021-04-20
Attending: INTERNAL MEDICINE
Payer: COMMERCIAL

## 2021-04-20 PROCEDURE — 91300 PR COVID VAC PFIZER DIL RECON 30 MCG/0.3 ML IM: CPT

## 2021-04-20 PROCEDURE — 0002A PR COVID VAC PFIZER DIL RECON 30 MCG/0.3 ML IM: CPT

## 2021-07-26 NOTE — PROGRESS NOTES
catrina is a 55 year old  female who presents for annual exam.     Besides routine health maintenance, she has no other health concerns today .    HPI:    Pap was done         GYNECOLOGIC HISTORY:    No LMP recorded. Patient is postmenopausal.  She  reports that she quit smoking about 16 years ago. Her smoking use included cigarettes. She has a 40.00 pack-year smoking history. She has never used smokeless tobacco.    Patient is sexually active.  STD testing offered?  Declined     Last PHQ-9 score on record =   PHQ-9 SCORE 2021   PHQ-9 Total Score MyChart -   PHQ-9 Total Score 0     Last GAD7 score on record =   MIRNA-7 SCORE 2021   Total Score 3     Alcohol Score = 1    HEALTH MAINTENANCE:  Cholesterol:   Recent Labs   Lab Test 20  0759   CHOL 193   HDL 52   *   TRIG 129   Last Mammo: 20, Result: Normal, Next Mammo: Today   Pap:   Lab Results   Component Value Date    PAP NIL, NEG-HPV 2020   Colonoscopy:  17, Result: polyp, Next Colonoscopy: 5 years.  Dexa:  never  Health maintenance updated:  yes    HISTORY:  OB History    Para Term  AB Living   1 0 0 0 1 0   SAB TAB Ectopic Multiple Live Births   1 0 0 0 0      # Outcome Date GA Lbr Galo/2nd Weight Sex Delivery Anes PTL Lv   1 SAB                There is no problem list on file for this patient.    Past Surgical History:   Procedure Laterality Date     ABDOMEN SURGERY      Rny and cholecystectomy     CHOLECYSTECTOMY       COLONOSCOPY       ESOPHAGOSCOPY, GASTROSCOPY, DUODENOSCOPY (EGD), COMBINED N/A 2018    Procedure: COMBINED ESOPHAGOSCOPY, GASTROSCOPY, DUODENOSCOPY (EGD);  Surgeon: Redd Nix MD;  Location:  GI      Social History     Tobacco Use     Smoking status: Former Smoker     Packs/day: 2.00     Years: 20.00     Pack years: 40.00     Types: Cigarettes     Quit date: 2004     Years since quittin.7     Smokeless tobacco: Never Used   Substance Use Topics     Alcohol use:  "Yes     Comment: rare      Problem (# of Occurrences) Relation (Name,Age of Onset)    Anxiety Disorder (1) Sister (Augusta Chamberlain)    Asthma (1) Sister (Augusta Chamberlain)    Cerebrovascular Disease (1) Maternal Grandmother (Leslie Bran)    Crohn's Disease (1) Nephew (Richard Alfonso)    Depression (1) Sister (Augusta Chamberlain)    Diabetes (1) Sister (Lynn ponce)    Hyperlipidemia (3) Sister (Lynn ponce), Sister (Augusta Chamberlain), Father (Harley Chamberlain)    Hypertension (1) Sister (Lynn ponce)    Mental Illness (1) Sister (Augusta Chamberlain)    Obesity (2) Sister (Lynn ponce), Sister (Augusta Chamberlain)    Other Cancer (1) Mother (Nicole Chamberlain): Adrenal    Prostate Cancer (1) Father (Harley Chamberlain)            Current Outpatient Medications   Medication Sig     ascorbic acid 500 MG TABS Indications: PN:       Cholecalciferol (VITAMIN D3) 2000 units CAPS      cyanocobalamin (VITAMIN  B-12) 1000 MCG tablet Take 1,000 mcg by mouth     estradiol (ESTRACE) 0.1 MG/GM vaginal cream Place 2 g vaginally twice a week     folic acid 0.8 MG CAPS      methotrexate 2.5 MG tablet      Multiple Vitamins-Minerals (THERAPEUTIC MULTIVIT/MINERAL) TABS      omeprazole (PRILOSEC) 40 MG capsule Take 40 mg by mouth     No current facility-administered medications for this visit.     Allergies   Allergen Reactions     Codeine      Other reaction(s): Gastrointestinal  Vomiting, tylenol #3 after wisdom teeth extraction     Dust Mites      Mold      Nsaids Other (See Comments)     Unable to take       Ragweeds      Seasonal Allergies        Past medical, surgical, social and family histories were reviewed and updated in EPIC.    ROS:   12 point review of systems negative other than symptoms noted below or in the HPI.  No urinary frequency or dysuria, bladder or kidney problems    EXAM:  /80   Ht 1.594 m (5' 2.75\")   Wt 132.9 kg (293 lb)   BMI 52.32 kg/m     BMI: Body mass index is 52.32 kg/m .    PHYSICAL EXAM:  Constitutional: "   Appearance: Well nourished, well developed, alert, in no acute distress    Chest:  Respiratory Effort:  Breathing unlabored  Cardiovascular:    Heart: Auscultation:  Regular rate, normal rhythm, no murmurs present  Breasts: Inspection of Breasts:  No lymphadenopathy present., Palpation of Breasts and Axillae:  No masses present on palpation, no breast tenderness., Axillary Lymph Nodes:  No lymphadenopathy present. and No nodularity, asymmetry or nipple discharge bilaterally.  Gastrointestinal:   Abdominal Examination:  Abdomen nontender to palpation, tone normal without rigidity or guarding, no masses present, umbilicus without lesions   Liver and Spleen:  No hepatomegaly present, liver nontender to palpation    Hernias:  No hernias present  Lymphatic: Lymph Nodes:  No other lymphadenopathy present  Skin:  General Inspection:  No rashes present, no lesions present, no areas of  discoloration  Neurologic:    Mental Status:  Oriented X3.  Normal strength and tone, sensory exam                grossly normal, mentation intact and speech normal.    Psychiatric:   Mentation appears normal and affect normal/bright.         Pelvic Exam:  External Genitalia:     Normal appearance for age, no discharge present, no tenderness present, no inflammatory lesions present, color normal  Vagina:     Normal vaginal vault without central or paravaginal defects, no discharge present, no inflammatory lesions present, no masses present  Bladder:     Nontender to palpation  Urethra:   Urethral Body:  Urethra palpation normal, urethra structural support normal   Urethral Meatus:  No erythema or lesions present  Cervix:     Appearance healthy, no lesions present, nontender to palpation, no bleeding present  Uterus:     Uterus: firm, normal sized and nontender  Adnexa:     No adnexal tenderness present, no adnexal masses present  Perineum:     Perineum within normal limits, no evidence of trauma, no rashes or skin lesions present  Anus:      Anus within normal limits, no hemorrhoids present  Inguinal Lymph Nodes:     No lymphadenopathy present  Pubic Hair:     Normal pubic hair distribution for age  Genitalia and Groin:     No rashes present, no lesions present, no areas of discoloration, no masses present      COUNSELING:   Reviewed preventive health counseling, as reflected in patient instructions    BMI: Body mass index is 52.32 kg/m .      ASSESSMENT:  55 year old female with satisfactory annual exam.    ICD-10-CM    1. Vaginal atrophy  N95.2        PLAN:  1. Not due for pap  2. Vaginal cream sent  3. Mammogram today  4. hgba1c today      Aby Ruiz MD

## 2021-07-27 ENCOUNTER — OFFICE VISIT (OUTPATIENT)
Dept: OBGYN | Facility: CLINIC | Age: 56
End: 2021-07-27
Payer: COMMERCIAL

## 2021-07-27 ENCOUNTER — ANCILLARY PROCEDURE (OUTPATIENT)
Dept: MAMMOGRAPHY | Facility: CLINIC | Age: 56
End: 2021-07-27
Payer: COMMERCIAL

## 2021-07-27 VITALS
SYSTOLIC BLOOD PRESSURE: 122 MMHG | HEIGHT: 63 IN | WEIGHT: 293 LBS | BODY MASS INDEX: 51.91 KG/M2 | DIASTOLIC BLOOD PRESSURE: 80 MMHG

## 2021-07-27 DIAGNOSIS — N95.2 VAGINAL ATROPHY: ICD-10-CM

## 2021-07-27 DIAGNOSIS — Z12.31 VISIT FOR SCREENING MAMMOGRAM: ICD-10-CM

## 2021-07-27 DIAGNOSIS — Z13.1 SCREENING FOR DIABETES MELLITUS: Primary | ICD-10-CM

## 2021-07-27 LAB — HBA1C MFR BLD: 5.7 % (ref 0–5.6)

## 2021-07-27 PROCEDURE — 36415 COLL VENOUS BLD VENIPUNCTURE: CPT | Performed by: OBSTETRICS & GYNECOLOGY

## 2021-07-27 PROCEDURE — 83036 HEMOGLOBIN GLYCOSYLATED A1C: CPT | Performed by: OBSTETRICS & GYNECOLOGY

## 2021-07-27 PROCEDURE — 99396 PREV VISIT EST AGE 40-64: CPT | Performed by: OBSTETRICS & GYNECOLOGY

## 2021-07-27 PROCEDURE — 77067 SCR MAMMO BI INCL CAD: CPT | Mod: TC | Performed by: RADIOLOGY

## 2021-07-27 RX ORDER — ESTRADIOL 0.1 MG/G
2 CREAM VAGINAL
Qty: 32 G | Refills: 3 | Status: SHIPPED | OUTPATIENT
Start: 2021-07-29

## 2021-07-27 RX ORDER — CYANOCOBALAMIN (VITAMIN B-12) 500 MCG
TABLET ORAL
COMMUNITY
Start: 2020-08-01

## 2021-07-27 ASSESSMENT — ANXIETY QUESTIONNAIRES
IF YOU CHECKED OFF ANY PROBLEMS ON THIS QUESTIONNAIRE, HOW DIFFICULT HAVE THESE PROBLEMS MADE IT FOR YOU TO DO YOUR WORK, TAKE CARE OF THINGS AT HOME, OR GET ALONG WITH OTHER PEOPLE: NOT DIFFICULT AT ALL
6. BECOMING EASILY ANNOYED OR IRRITABLE: SEVERAL DAYS
3. WORRYING TOO MUCH ABOUT DIFFERENT THINGS: NOT AT ALL
2. NOT BEING ABLE TO STOP OR CONTROL WORRYING: SEVERAL DAYS
5. BEING SO RESTLESS THAT IT IS HARD TO SIT STILL: NOT AT ALL
7. FEELING AFRAID AS IF SOMETHING AWFUL MIGHT HAPPEN: NOT AT ALL
1. FEELING NERVOUS, ANXIOUS, OR ON EDGE: SEVERAL DAYS
GAD7 TOTAL SCORE: 3

## 2021-07-27 ASSESSMENT — MIFFLIN-ST. JEOR: SCORE: 1894.2

## 2021-07-27 ASSESSMENT — PATIENT HEALTH QUESTIONNAIRE - PHQ9
SUM OF ALL RESPONSES TO PHQ QUESTIONS 1-9: 0
5. POOR APPETITE OR OVEREATING: NOT AT ALL

## 2021-07-28 ASSESSMENT — ANXIETY QUESTIONNAIRES: GAD7 TOTAL SCORE: 3

## 2021-10-10 ENCOUNTER — HEALTH MAINTENANCE LETTER (OUTPATIENT)
Age: 56
End: 2021-10-10

## 2022-09-18 ENCOUNTER — HEALTH MAINTENANCE LETTER (OUTPATIENT)
Age: 57
End: 2022-09-18

## 2023-10-08 ENCOUNTER — HEALTH MAINTENANCE LETTER (OUTPATIENT)
Age: 58
End: 2023-10-08

## (undated) RX ORDER — FENTANYL CITRATE 50 UG/ML
INJECTION, SOLUTION INTRAMUSCULAR; INTRAVENOUS
Status: DISPENSED
Start: 2018-11-28